# Patient Record
Sex: MALE | Race: WHITE | NOT HISPANIC OR LATINO | Employment: FULL TIME | URBAN - METROPOLITAN AREA
[De-identification: names, ages, dates, MRNs, and addresses within clinical notes are randomized per-mention and may not be internally consistent; named-entity substitution may affect disease eponyms.]

---

## 2023-08-12 ENCOUNTER — HOSPITAL ENCOUNTER (INPATIENT)
Facility: HOSPITAL | Age: 23
LOS: 4 days | Discharge: HOME HEALTH | DRG: 958 | End: 2023-08-16
Attending: EMERGENCY MEDICINE | Admitting: SURGERY
Payer: MEDICAID
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
## 2023-08-12 DIAGNOSIS — S52.302A CLOSED FRACTURE OF SHAFT OF LEFT RADIUS, UNSPECIFIED FRACTURE MORPHOLOGY, INITIAL ENCOUNTER: ICD-10-CM

## 2023-08-12 DIAGNOSIS — T14.90XA BLUNT TRAUMA: ICD-10-CM

## 2023-08-12 DIAGNOSIS — T07.XXXA ABRASION, MULTIPLE SITES: Primary | ICD-10-CM

## 2023-08-12 DIAGNOSIS — R07.9 CHEST PAIN: ICD-10-CM

## 2023-08-12 DIAGNOSIS — S90.811A ABRASION, RIGHT FOOT, INITIAL ENCOUNTER: ICD-10-CM

## 2023-08-12 DIAGNOSIS — S32.592A CLOSED FRACTURE OF LEFT INFERIOR PUBIC RAMUS, INITIAL ENCOUNTER: ICD-10-CM

## 2023-08-12 DIAGNOSIS — J93.9 PNEUMOTHORAX, UNSPECIFIED TYPE: ICD-10-CM

## 2023-08-12 DIAGNOSIS — S52.352A CLOSED DISPLACED COMMINUTED FRACTURE OF SHAFT OF LEFT RADIUS, INITIAL ENCOUNTER: ICD-10-CM

## 2023-08-12 LAB
ABORH RETYPE: NORMAL
ALBUMIN SERPL-MCNC: 4.4 G/DL (ref 3.5–5)
ALBUMIN/GLOB SERPL: 1.5 RATIO (ref 1.1–2)
ALP SERPL-CCNC: 87 UNIT/L (ref 40–150)
ALT SERPL-CCNC: 40 UNIT/L (ref 0–55)
APTT PPP: 24.5 SECONDS (ref 23.2–33.7)
AST SERPL-CCNC: 45 UNIT/L (ref 5–34)
BASOPHILS # BLD AUTO: 0.02 X10(3)/MCL
BASOPHILS NFR BLD AUTO: 0.2 %
BILIRUB SERPL-MCNC: 0.3 MG/DL
BUN SERPL-MCNC: 15.4 MG/DL (ref 8.9–20.6)
CALCIUM SERPL-MCNC: 9.5 MG/DL (ref 8.4–10.2)
CHLORIDE SERPL-SCNC: 106 MMOL/L (ref 98–107)
CO2 SERPL-SCNC: 22 MMOL/L (ref 22–29)
CREAT SERPL-MCNC: 1.06 MG/DL (ref 0.73–1.18)
EOSINOPHIL # BLD AUTO: 0.01 X10(3)/MCL (ref 0–0.9)
EOSINOPHIL NFR BLD AUTO: 0.1 %
ERYTHROCYTE [DISTWIDTH] IN BLOOD BY AUTOMATED COUNT: 14.4 % (ref 11.5–17)
ETHANOL SERPL-MCNC: <10 MG/DL
GFR SERPLBLD CREATININE-BSD FMLA CKD-EPI: >60 MLS/MIN/1.73/M2
GLOBULIN SER-MCNC: 3 GM/DL (ref 2.4–3.5)
GLUCOSE SERPL-MCNC: 103 MG/DL (ref 74–100)
HCT VFR BLD AUTO: 43.4 % (ref 42–52)
HGB BLD-MCNC: 14.7 G/DL (ref 14–18)
IMM GRANULOCYTES # BLD AUTO: 0.49 X10(3)/MCL (ref 0–0.04)
IMM GRANULOCYTES NFR BLD AUTO: 4.4 %
INR PPP: 1
LACTATE SERPL-SCNC: 2.9 MMOL/L (ref 0.5–2.2)
LYMPHOCYTES # BLD AUTO: 2.6 X10(3)/MCL (ref 0.6–4.6)
LYMPHOCYTES NFR BLD AUTO: 23.4 %
MCH RBC QN AUTO: 26.7 PG (ref 27–31)
MCHC RBC AUTO-ENTMCNC: 33.9 G/DL (ref 33–36)
MCV RBC AUTO: 78.9 FL (ref 80–94)
MONOCYTES # BLD AUTO: 0.69 X10(3)/MCL (ref 0.1–1.3)
MONOCYTES NFR BLD AUTO: 6.2 %
NEUTROPHILS # BLD AUTO: 7.31 X10(3)/MCL (ref 2.1–9.2)
NEUTROPHILS NFR BLD AUTO: 65.7 %
NRBC BLD AUTO-RTO: 0 %
PLATELET # BLD AUTO: 206 X10(3)/MCL (ref 130–400)
PMV BLD AUTO: 10.7 FL (ref 7.4–10.4)
POTASSIUM SERPL-SCNC: 3.6 MMOL/L (ref 3.5–5.1)
PROT SERPL-MCNC: 7.4 GM/DL (ref 6.4–8.3)
PROTHROMBIN TIME: 13.4 SECONDS (ref 12.5–14.5)
RBC # BLD AUTO: 5.5 X10(6)/MCL (ref 4.7–6.1)
SODIUM SERPL-SCNC: 141 MMOL/L (ref 136–145)
TROPONIN I SERPL-MCNC: 0.01 NG/ML (ref 0–0.04)
WBC # SPEC AUTO: 11.12 X10(3)/MCL (ref 4.5–11.5)

## 2023-08-12 PROCEDURE — 85610 PROTHROMBIN TIME: CPT | Performed by: EMERGENCY MEDICINE

## 2023-08-12 PROCEDURE — 85730 THROMBOPLASTIN TIME PARTIAL: CPT | Performed by: EMERGENCY MEDICINE

## 2023-08-12 PROCEDURE — G0390 TRAUMA RESPONS W/HOSP CRITI: HCPCS

## 2023-08-12 PROCEDURE — 63600175 PHARM REV CODE 636 W HCPCS: Performed by: EMERGENCY MEDICINE

## 2023-08-12 PROCEDURE — 99285 EMERGENCY DEPT VISIT HI MDM: CPT | Mod: 25

## 2023-08-12 PROCEDURE — 84484 ASSAY OF TROPONIN QUANT: CPT | Performed by: EMERGENCY MEDICINE

## 2023-08-12 PROCEDURE — 86900 BLOOD TYPING SEROLOGIC ABO: CPT | Performed by: EMERGENCY MEDICINE

## 2023-08-12 PROCEDURE — 96375 TX/PRO/DX INJ NEW DRUG ADDON: CPT

## 2023-08-12 PROCEDURE — 63600175 PHARM REV CODE 636 W HCPCS

## 2023-08-12 PROCEDURE — 80053 COMPREHEN METABOLIC PANEL: CPT | Performed by: EMERGENCY MEDICINE

## 2023-08-12 PROCEDURE — 85025 COMPLETE CBC W/AUTO DIFF WBC: CPT | Performed by: EMERGENCY MEDICINE

## 2023-08-12 PROCEDURE — 82077 ASSAY SPEC XCP UR&BREATH IA: CPT | Performed by: EMERGENCY MEDICINE

## 2023-08-12 PROCEDURE — 83605 ASSAY OF LACTIC ACID: CPT | Performed by: EMERGENCY MEDICINE

## 2023-08-12 PROCEDURE — 99291 CRITICAL CARE FIRST HOUR: CPT

## 2023-08-12 PROCEDURE — 20800000 HC ICU TRAUMA

## 2023-08-12 PROCEDURE — 96374 THER/PROPH/DIAG INJ IV PUSH: CPT

## 2023-08-12 PROCEDURE — 11000001 HC ACUTE MED/SURG PRIVATE ROOM

## 2023-08-12 RX ORDER — CEFAZOLIN SODIUM 1 G/3ML
INJECTION, POWDER, FOR SOLUTION INTRAMUSCULAR; INTRAVENOUS
Status: COMPLETED
Start: 2023-08-12 | End: 2023-08-12

## 2023-08-12 RX ORDER — FENTANYL CITRATE 50 UG/ML
INJECTION, SOLUTION INTRAMUSCULAR; INTRAVENOUS CODE/TRAUMA/SEDATION MEDICATION
Status: DISCONTINUED | OUTPATIENT
Start: 2023-08-12 | End: 2023-08-12

## 2023-08-12 RX ORDER — SODIUM CHLORIDE 9 MG/ML
INJECTION, SOLUTION INTRAVENOUS CONTINUOUS
Status: DISCONTINUED | OUTPATIENT
Start: 2023-08-13 | End: 2023-08-12

## 2023-08-12 RX ORDER — BISACODYL 10 MG
10 SUPPOSITORY, RECTAL RECTAL DAILY PRN
Status: DISCONTINUED | OUTPATIENT
Start: 2023-08-13 | End: 2023-08-12

## 2023-08-12 RX ORDER — FAMOTIDINE 20 MG/1
20 TABLET, FILM COATED ORAL 2 TIMES DAILY
Status: DISCONTINUED | OUTPATIENT
Start: 2023-08-12 | End: 2023-08-12

## 2023-08-12 RX ORDER — TALC
6 POWDER (GRAM) TOPICAL NIGHTLY PRN
Status: DISCONTINUED | OUTPATIENT
Start: 2023-08-13 | End: 2023-08-12

## 2023-08-12 RX ORDER — FENTANYL CITRATE 50 UG/ML
INJECTION, SOLUTION INTRAMUSCULAR; INTRAVENOUS
Status: DISPENSED
Start: 2023-08-12 | End: 2023-08-13

## 2023-08-12 RX ORDER — ONDANSETRON 2 MG/ML
INJECTION INTRAMUSCULAR; INTRAVENOUS CODE/TRAUMA/SEDATION MEDICATION
Status: DISCONTINUED | OUTPATIENT
Start: 2023-08-12 | End: 2023-08-12

## 2023-08-12 RX ORDER — ONDANSETRON 2 MG/ML
INJECTION INTRAMUSCULAR; INTRAVENOUS
Status: DISPENSED
Start: 2023-08-12 | End: 2023-08-13

## 2023-08-12 RX ORDER — POLYETHYLENE GLYCOL 3350 17 G/17G
17 POWDER, FOR SOLUTION ORAL 2 TIMES DAILY
Status: DISCONTINUED | OUTPATIENT
Start: 2023-08-12 | End: 2023-08-12

## 2023-08-12 RX ORDER — FENTANYL CITRATE-0.9 % NACL/PF 10 MCG/ML
0-250 PLASTIC BAG, INJECTION (ML) INTRAVENOUS CONTINUOUS
Status: DISCONTINUED | OUTPATIENT
Start: 2023-08-13 | End: 2023-08-12

## 2023-08-12 RX ORDER — PROPOFOL 10 MG/ML
0-50 INJECTION, EMULSION INTRAVENOUS CONTINUOUS
Status: DISCONTINUED | OUTPATIENT
Start: 2023-08-13 | End: 2023-08-12

## 2023-08-12 RX ADMIN — ONDANSETRON 4 MG: 2 INJECTION INTRAMUSCULAR; INTRAVENOUS at 11:08

## 2023-08-12 RX ADMIN — CEFAZOLIN 2000 MG: 330 INJECTION, POWDER, FOR SOLUTION INTRAMUSCULAR; INTRAVENOUS at 11:08

## 2023-08-12 RX ADMIN — FENTANYL CITRATE 100 MCG: 50 INJECTION, SOLUTION INTRAMUSCULAR; INTRAVENOUS at 11:08
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
## 2023-08-13 ENCOUNTER — ANESTHESIA EVENT (OUTPATIENT)
Dept: SURGERY | Facility: HOSPITAL | Age: 23
DRG: 958 | End: 2023-08-13
Payer: MEDICAID

## 2023-08-13 ENCOUNTER — ANESTHESIA (OUTPATIENT)
Dept: SURGERY | Facility: HOSPITAL | Age: 23
DRG: 958 | End: 2023-08-13
Payer: MEDICAID

## 2023-08-13 PROBLEM — S52.302A FRACTURE OF RADIAL SHAFT, LEFT, CLOSED: Status: ACTIVE | Noted: 2023-08-13

## 2023-08-13 PROBLEM — S32.810A: Status: ACTIVE | Noted: 2023-08-13

## 2023-08-13 LAB
ALBUMIN SERPL-MCNC: 4 G/DL (ref 3.5–5)
ALBUMIN/GLOB SERPL: 1.9 RATIO (ref 1.1–2)
ALP SERPL-CCNC: 74 UNIT/L (ref 40–150)
ALT SERPL-CCNC: 37 UNIT/L (ref 0–55)
AST SERPL-CCNC: 54 UNIT/L (ref 5–34)
BILIRUB SERPL-MCNC: 0.4 MG/DL
BUN SERPL-MCNC: 15.2 MG/DL (ref 8.9–20.6)
CALCIUM SERPL-MCNC: 8.8 MG/DL (ref 8.4–10.2)
CHLORIDE SERPL-SCNC: 108 MMOL/L (ref 98–107)
CO2 SERPL-SCNC: 21 MMOL/L (ref 22–29)
CREAT SERPL-MCNC: 0.94 MG/DL (ref 0.73–1.18)
ERYTHROCYTE [DISTWIDTH] IN BLOOD BY AUTOMATED COUNT: 14.4 % (ref 11.5–17)
GFR SERPLBLD CREATININE-BSD FMLA CKD-EPI: >60 MLS/MIN/1.73/M2
GLOBULIN SER-MCNC: 2.1 GM/DL (ref 2.4–3.5)
GLUCOSE SERPL-MCNC: 121 MG/DL (ref 70–110)
GLUCOSE SERPL-MCNC: 99 MG/DL (ref 74–100)
GROUP & RH: NORMAL
HCT VFR BLD AUTO: 38.4 % (ref 42–52)
HGB BLD-MCNC: 13.2 G/DL (ref 14–18)
INDIRECT COOMBS GEL: NORMAL
LACTATE SERPL-SCNC: 0.9 MMOL/L (ref 0.5–2.2)
LACTATE SERPL-SCNC: 1.6 MMOL/L (ref 0.5–2.2)
MAGNESIUM SERPL-MCNC: 1.7 MG/DL (ref 1.6–2.6)
MCH RBC QN AUTO: 26.9 PG (ref 27–31)
MCHC RBC AUTO-ENTMCNC: 34.4 G/DL (ref 33–36)
MCV RBC AUTO: 78.2 FL (ref 80–94)
NRBC BLD AUTO-RTO: 0 %
PHOSPHATE SERPL-MCNC: 3 MG/DL (ref 2.3–4.7)
PLATELET # BLD AUTO: 158 X10(3)/MCL (ref 130–400)
PMV BLD AUTO: 10.7 FL (ref 7.4–10.4)
POTASSIUM SERPL-SCNC: 3.8 MMOL/L (ref 3.5–5.1)
PROT SERPL-MCNC: 6.1 GM/DL (ref 6.4–8.3)
RBC # BLD AUTO: 4.91 X10(6)/MCL (ref 4.7–6.1)
SODIUM SERPL-SCNC: 139 MMOL/L (ref 136–145)
SPECIMEN OUTDATE: NORMAL
WBC # SPEC AUTO: 15.89 X10(3)/MCL (ref 4.5–11.5)

## 2023-08-13 PROCEDURE — 25515 OPTX RADIAL SHAFT FRACTURE: CPT | Mod: AS,LT,, | Performed by: PHYSICIAN ASSISTANT

## 2023-08-13 PROCEDURE — 85027 COMPLETE CBC AUTOMATED: CPT

## 2023-08-13 PROCEDURE — 25000003 PHARM REV CODE 250: Performed by: NURSE ANESTHETIST, CERTIFIED REGISTERED

## 2023-08-13 PROCEDURE — 63600175 PHARM REV CODE 636 W HCPCS: Performed by: EMERGENCY MEDICINE

## 2023-08-13 PROCEDURE — 80053 COMPREHEN METABOLIC PANEL: CPT

## 2023-08-13 PROCEDURE — 24665 PR OPEN TX RADIAL HEAD/NECK FRACTURE: ICD-10-PCS | Mod: 51,LT,, | Performed by: ORTHOPAEDIC SURGERY

## 2023-08-13 PROCEDURE — 84100 ASSAY OF PHOSPHORUS: CPT

## 2023-08-13 PROCEDURE — 24665 OPTX RADIAL HEAD/NECK FX: CPT | Mod: AS,51,LT, | Performed by: PHYSICIAN ASSISTANT

## 2023-08-13 PROCEDURE — C1713 ANCHOR/SCREW BN/BN,TIS/BN: HCPCS | Performed by: ORTHOPAEDIC SURGERY

## 2023-08-13 PROCEDURE — 36000711: Performed by: ORTHOPAEDIC SURGERY

## 2023-08-13 PROCEDURE — 99223 1ST HOSP IP/OBS HIGH 75: CPT | Mod: 57,,, | Performed by: ORTHOPAEDIC SURGERY

## 2023-08-13 PROCEDURE — 71000015 HC POSTOP RECOV 1ST HR: Performed by: ORTHOPAEDIC SURGERY

## 2023-08-13 PROCEDURE — 24685 PR OPEN TX ULNAR FRACTURE PROX END: ICD-10-PCS | Mod: AS,51,LT, | Performed by: PHYSICIAN ASSISTANT

## 2023-08-13 PROCEDURE — 36000710: Performed by: ORTHOPAEDIC SURGERY

## 2023-08-13 PROCEDURE — 27201423 OPTIME MED/SURG SUP & DEVICES STERILE SUPPLY: Performed by: ORTHOPAEDIC SURGERY

## 2023-08-13 PROCEDURE — 83605 ASSAY OF LACTIC ACID: CPT

## 2023-08-13 PROCEDURE — 63600175 PHARM REV CODE 636 W HCPCS: Performed by: ANESTHESIOLOGY

## 2023-08-13 PROCEDURE — 37000008 HC ANESTHESIA 1ST 15 MINUTES: Performed by: ORTHOPAEDIC SURGERY

## 2023-08-13 PROCEDURE — 25000003 PHARM REV CODE 250

## 2023-08-13 PROCEDURE — 83735 ASSAY OF MAGNESIUM: CPT

## 2023-08-13 PROCEDURE — 11000001 HC ACUTE MED/SURG PRIVATE ROOM

## 2023-08-13 PROCEDURE — 71000039 HC RECOVERY, EACH ADD'L HOUR: Performed by: ORTHOPAEDIC SURGERY

## 2023-08-13 PROCEDURE — 71000033 HC RECOVERY, INTIAL HOUR: Performed by: ORTHOPAEDIC SURGERY

## 2023-08-13 PROCEDURE — 63600175 PHARM REV CODE 636 W HCPCS

## 2023-08-13 PROCEDURE — 37000009 HC ANESTHESIA EA ADD 15 MINS: Performed by: ORTHOPAEDIC SURGERY

## 2023-08-13 PROCEDURE — 63600175 PHARM REV CODE 636 W HCPCS: Performed by: ORTHOPAEDIC SURGERY

## 2023-08-13 PROCEDURE — 24685 PR OPEN TX ULNAR FRACTURE PROX END: ICD-10-PCS | Mod: 51,LT,, | Performed by: ORTHOPAEDIC SURGERY

## 2023-08-13 PROCEDURE — D9220A PRA ANESTHESIA: ICD-10-PCS | Mod: CRNA,,, | Performed by: NURSE ANESTHETIST, CERTIFIED REGISTERED

## 2023-08-13 PROCEDURE — 24685 OPTX ULNAR FX PROX END W/FIX: CPT | Mod: 51,LT,, | Performed by: ORTHOPAEDIC SURGERY

## 2023-08-13 PROCEDURE — D9220A PRA ANESTHESIA: ICD-10-PCS | Mod: ANES,,, | Performed by: ANESTHESIOLOGY

## 2023-08-13 PROCEDURE — 24665 PR OPEN TX RADIAL HEAD/NECK FRACTURE: ICD-10-PCS | Mod: AS,51,LT, | Performed by: PHYSICIAN ASSISTANT

## 2023-08-13 PROCEDURE — D9220A PRA ANESTHESIA: Mod: CRNA,,, | Performed by: NURSE ANESTHETIST, CERTIFIED REGISTERED

## 2023-08-13 PROCEDURE — 93005 ELECTROCARDIOGRAM TRACING: CPT

## 2023-08-13 PROCEDURE — 25515 PR OPEN TREATMENT RADIAL SHAFT FRACTURE: ICD-10-PCS | Mod: LT,,, | Performed by: ORTHOPAEDIC SURGERY

## 2023-08-13 PROCEDURE — 63600175 PHARM REV CODE 636 W HCPCS: Performed by: NURSE ANESTHETIST, CERTIFIED REGISTERED

## 2023-08-13 PROCEDURE — 24685 OPTX ULNAR FX PROX END W/FIX: CPT | Mod: AS,51,LT, | Performed by: PHYSICIAN ASSISTANT

## 2023-08-13 PROCEDURE — 99223 PR INITIAL HOSPITAL CARE,LEVL III: ICD-10-PCS | Mod: 57,,, | Performed by: ORTHOPAEDIC SURGERY

## 2023-08-13 PROCEDURE — D9220A PRA ANESTHESIA: Mod: ANES,,, | Performed by: ANESTHESIOLOGY

## 2023-08-13 PROCEDURE — 25515 OPTX RADIAL SHAFT FRACTURE: CPT | Mod: LT,,, | Performed by: ORTHOPAEDIC SURGERY

## 2023-08-13 PROCEDURE — 25515 PR OPEN TREATMENT RADIAL SHAFT FRACTURE: ICD-10-PCS | Mod: AS,LT,, | Performed by: PHYSICIAN ASSISTANT

## 2023-08-13 PROCEDURE — 24665 OPTX RADIAL HEAD/NECK FX: CPT | Mod: 51,LT,, | Performed by: ORTHOPAEDIC SURGERY

## 2023-08-13 PROCEDURE — 20800000 HC ICU TRAUMA

## 2023-08-13 DEVICE — IMPLANTABLE DEVICE: Type: IMPLANTABLE DEVICE | Site: ARM | Status: FUNCTIONAL

## 2023-08-13 DEVICE — SCREW MULTITHREAD LOCKING: Type: IMPLANTABLE DEVICE | Site: ARM | Status: FUNCTIONAL

## 2023-08-13 RX ORDER — METHOCARBAMOL 500 MG/1
500 TABLET, FILM COATED ORAL EVERY 8 HOURS
Status: DISCONTINUED | OUTPATIENT
Start: 2023-08-13 | End: 2023-08-16 | Stop reason: HOSPADM

## 2023-08-13 RX ORDER — DEXAMETHASONE SODIUM PHOSPHATE 4 MG/ML
INJECTION, SOLUTION INTRA-ARTICULAR; INTRALESIONAL; INTRAMUSCULAR; INTRAVENOUS; SOFT TISSUE
Status: DISCONTINUED | OUTPATIENT
Start: 2023-08-13 | End: 2023-08-13

## 2023-08-13 RX ORDER — HYDROMORPHONE HYDROCHLORIDE 2 MG/ML
1 INJECTION, SOLUTION INTRAMUSCULAR; INTRAVENOUS; SUBCUTANEOUS
Status: COMPLETED | OUTPATIENT
Start: 2023-08-13 | End: 2023-08-13

## 2023-08-13 RX ORDER — HYDROMORPHONE HYDROCHLORIDE 2 MG/ML
0.2 INJECTION, SOLUTION INTRAMUSCULAR; INTRAVENOUS; SUBCUTANEOUS EVERY 5 MIN PRN
Status: DISCONTINUED | OUTPATIENT
Start: 2023-08-13 | End: 2023-08-14

## 2023-08-13 RX ORDER — CEFAZOLIN SODIUM 1 G/3ML
INJECTION, POWDER, FOR SOLUTION INTRAMUSCULAR; INTRAVENOUS
Status: DISCONTINUED | OUTPATIENT
Start: 2023-08-13 | End: 2023-08-13

## 2023-08-13 RX ORDER — FENTANYL CITRATE 50 UG/ML
INJECTION, SOLUTION INTRAMUSCULAR; INTRAVENOUS
Status: DISPENSED
Start: 2023-08-13 | End: 2023-08-14

## 2023-08-13 RX ORDER — PROCHLORPERAZINE EDISYLATE 5 MG/ML
5 INJECTION INTRAMUSCULAR; INTRAVENOUS EVERY 30 MIN PRN
Status: DISCONTINUED | OUTPATIENT
Start: 2023-08-13 | End: 2023-08-14 | Stop reason: HOSPADM

## 2023-08-13 RX ORDER — HYDROMORPHONE HYDROCHLORIDE 2 MG/ML
INJECTION, SOLUTION INTRAMUSCULAR; INTRAVENOUS; SUBCUTANEOUS
Status: COMPLETED
Start: 2023-08-13 | End: 2023-08-13

## 2023-08-13 RX ORDER — LIDOCAINE HYDROCHLORIDE 10 MG/ML
1 INJECTION, SOLUTION EPIDURAL; INFILTRATION; INTRACAUDAL; PERINEURAL ONCE
Status: DISCONTINUED | OUTPATIENT
Start: 2023-08-13 | End: 2023-08-14 | Stop reason: HOSPADM

## 2023-08-13 RX ORDER — ACETAMINOPHEN 325 MG/1
650 TABLET ORAL EVERY 4 HOURS
Status: DISCONTINUED | OUTPATIENT
Start: 2023-08-13 | End: 2023-08-15

## 2023-08-13 RX ORDER — ONDANSETRON 2 MG/ML
INJECTION INTRAMUSCULAR; INTRAVENOUS
Status: DISCONTINUED | OUTPATIENT
Start: 2023-08-13 | End: 2023-08-13

## 2023-08-13 RX ORDER — DOCUSATE SODIUM 100 MG/1
100 CAPSULE, LIQUID FILLED ORAL 2 TIMES DAILY
Status: DISCONTINUED | OUTPATIENT
Start: 2023-08-13 | End: 2023-08-16 | Stop reason: HOSPADM

## 2023-08-13 RX ORDER — SODIUM CHLORIDE, SODIUM GLUCONATE, SODIUM ACETATE, POTASSIUM CHLORIDE AND MAGNESIUM CHLORIDE 30; 37; 368; 526; 502 MG/100ML; MG/100ML; MG/100ML; MG/100ML; MG/100ML
INJECTION, SOLUTION INTRAVENOUS CONTINUOUS
Status: DISCONTINUED | OUTPATIENT
Start: 2023-08-13 | End: 2023-08-14

## 2023-08-13 RX ORDER — OXYCODONE HYDROCHLORIDE 5 MG/1
5 TABLET ORAL EVERY 4 HOURS PRN
Status: DISCONTINUED | OUTPATIENT
Start: 2023-08-13 | End: 2023-08-15

## 2023-08-13 RX ORDER — HYDROMORPHONE HYDROCHLORIDE 2 MG/ML
0.4 INJECTION, SOLUTION INTRAMUSCULAR; INTRAVENOUS; SUBCUTANEOUS EVERY 5 MIN PRN
Status: DISCONTINUED | OUTPATIENT
Start: 2023-08-13 | End: 2023-08-14

## 2023-08-13 RX ORDER — PROPOFOL 10 MG/ML
VIAL (ML) INTRAVENOUS
Status: DISCONTINUED | OUTPATIENT
Start: 2023-08-13 | End: 2023-08-13

## 2023-08-13 RX ORDER — CEFAZOLIN SODIUM 2 G/50ML
2 SOLUTION INTRAVENOUS
Status: COMPLETED | OUTPATIENT
Start: 2023-08-14 | End: 2023-08-14

## 2023-08-13 RX ORDER — POLYETHYLENE GLYCOL 3350 17 G/17G
17 POWDER, FOR SOLUTION ORAL 2 TIMES DAILY
Status: DISCONTINUED | OUTPATIENT
Start: 2023-08-13 | End: 2023-08-16 | Stop reason: HOSPADM

## 2023-08-13 RX ORDER — MEPERIDINE HYDROCHLORIDE 25 MG/ML
INJECTION INTRAMUSCULAR; INTRAVENOUS; SUBCUTANEOUS
Status: COMPLETED
Start: 2023-08-13 | End: 2023-08-13

## 2023-08-13 RX ORDER — FENTANYL CITRATE 50 UG/ML
INJECTION, SOLUTION INTRAMUSCULAR; INTRAVENOUS
Status: DISCONTINUED | OUTPATIENT
Start: 2023-08-13 | End: 2023-08-13

## 2023-08-13 RX ORDER — MIDAZOLAM HYDROCHLORIDE 1 MG/ML
INJECTION INTRAMUSCULAR; INTRAVENOUS
Status: DISCONTINUED | OUTPATIENT
Start: 2023-08-13 | End: 2023-08-13

## 2023-08-13 RX ORDER — ONDANSETRON 4 MG/1
8 TABLET, ORALLY DISINTEGRATING ORAL EVERY 6 HOURS PRN
Status: DISCONTINUED | OUTPATIENT
Start: 2023-08-13 | End: 2023-08-14 | Stop reason: HOSPADM

## 2023-08-13 RX ORDER — ACETAMINOPHEN 10 MG/ML
INJECTION, SOLUTION INTRAVENOUS
Status: COMPLETED
Start: 2023-08-13 | End: 2023-08-13

## 2023-08-13 RX ORDER — FENTANYL CITRATE 50 UG/ML
50 INJECTION, SOLUTION INTRAMUSCULAR; INTRAVENOUS EVERY 30 MIN PRN
Status: DISCONTINUED | OUTPATIENT
Start: 2023-08-13 | End: 2023-08-14

## 2023-08-13 RX ORDER — ONDANSETRON 2 MG/ML
4 INJECTION INTRAMUSCULAR; INTRAVENOUS DAILY PRN
Status: DISCONTINUED | OUTPATIENT
Start: 2023-08-13 | End: 2023-08-14 | Stop reason: HOSPADM

## 2023-08-13 RX ORDER — ENOXAPARIN SODIUM 100 MG/ML
40 INJECTION SUBCUTANEOUS EVERY 12 HOURS
Status: DISCONTINUED | OUTPATIENT
Start: 2023-08-13 | End: 2023-08-16 | Stop reason: HOSPADM

## 2023-08-13 RX ORDER — ADHESIVE BANDAGE
30 BANDAGE TOPICAL DAILY PRN
Status: DISCONTINUED | OUTPATIENT
Start: 2023-08-13 | End: 2023-08-16 | Stop reason: HOSPADM

## 2023-08-13 RX ORDER — MEPERIDINE HYDROCHLORIDE 25 MG/ML
12.5 INJECTION INTRAMUSCULAR; INTRAVENOUS; SUBCUTANEOUS EVERY 10 MIN PRN
Status: DISCONTINUED | OUTPATIENT
Start: 2023-08-13 | End: 2023-08-14 | Stop reason: HOSPADM

## 2023-08-13 RX ORDER — GABAPENTIN 300 MG/1
300 CAPSULE ORAL 3 TIMES DAILY
Status: DISCONTINUED | OUTPATIENT
Start: 2023-08-13 | End: 2023-08-16 | Stop reason: HOSPADM

## 2023-08-13 RX ORDER — CEFAZOLIN SODIUM 2 G/50ML
SOLUTION INTRAVENOUS
Status: DISPENSED
Start: 2023-08-13 | End: 2023-08-13

## 2023-08-13 RX ORDER — ACETAMINOPHEN 10 MG/ML
1000 INJECTION, SOLUTION INTRAVENOUS ONCE
Status: COMPLETED | OUTPATIENT
Start: 2023-08-13 | End: 2023-08-13

## 2023-08-13 RX ORDER — LORAZEPAM 2 MG/ML
0.25 INJECTION INTRAMUSCULAR ONCE AS NEEDED
Status: COMPLETED | OUTPATIENT
Start: 2023-08-13 | End: 2023-08-14

## 2023-08-13 RX ORDER — IPRATROPIUM BROMIDE AND ALBUTEROL SULFATE 2.5; .5 MG/3ML; MG/3ML
3 SOLUTION RESPIRATORY (INHALATION)
Status: DISCONTINUED | OUTPATIENT
Start: 2023-08-13 | End: 2023-08-14 | Stop reason: HOSPADM

## 2023-08-13 RX ORDER — TALC
6 POWDER (GRAM) TOPICAL NIGHTLY PRN
Status: DISCONTINUED | OUTPATIENT
Start: 2023-08-13 | End: 2023-08-16 | Stop reason: HOSPADM

## 2023-08-13 RX ORDER — SODIUM CHLORIDE, SODIUM LACTATE, POTASSIUM CHLORIDE, CALCIUM CHLORIDE 600; 310; 30; 20 MG/100ML; MG/100ML; MG/100ML; MG/100ML
INJECTION, SOLUTION INTRAVENOUS CONTINUOUS
Status: DISCONTINUED | OUTPATIENT
Start: 2023-08-13 | End: 2023-08-14

## 2023-08-13 RX ORDER — MIDAZOLAM HYDROCHLORIDE 1 MG/ML
2 INJECTION INTRAMUSCULAR; INTRAVENOUS ONCE AS NEEDED
Status: COMPLETED | OUTPATIENT
Start: 2023-08-13 | End: 2023-08-13

## 2023-08-13 RX ORDER — VANCOMYCIN HYDROCHLORIDE 1 G/20ML
INJECTION, POWDER, LYOPHILIZED, FOR SOLUTION INTRAVENOUS
Status: DISCONTINUED | OUTPATIENT
Start: 2023-08-13 | End: 2023-08-13 | Stop reason: HOSPADM

## 2023-08-13 RX ORDER — OXYCODONE HYDROCHLORIDE 10 MG/1
10 TABLET ORAL EVERY 4 HOURS PRN
Status: DISCONTINUED | OUTPATIENT
Start: 2023-08-13 | End: 2023-08-15

## 2023-08-13 RX ORDER — ROCURONIUM BROMIDE 10 MG/ML
INJECTION, SOLUTION INTRAVENOUS
Status: DISCONTINUED | OUTPATIENT
Start: 2023-08-13 | End: 2023-08-13

## 2023-08-13 RX ADMIN — SODIUM CHLORIDE, POTASSIUM CHLORIDE, SODIUM LACTATE AND CALCIUM CHLORIDE: 600; 310; 30; 20 INJECTION, SOLUTION INTRAVENOUS at 05:08

## 2023-08-13 RX ADMIN — SODIUM CHLORIDE, SODIUM GLUCONATE, SODIUM ACETATE, POTASSIUM CHLORIDE AND MAGNESIUM CHLORIDE: 526; 502; 368; 37; 30 INJECTION, SOLUTION INTRAVENOUS at 04:08

## 2023-08-13 RX ADMIN — MIDAZOLAM HYDROCHLORIDE 2 MG: 1 INJECTION, SOLUTION INTRAMUSCULAR; INTRAVENOUS at 12:08

## 2023-08-13 RX ADMIN — MEPERIDINE HYDROCHLORIDE 12.5 MG: 25 INJECTION INTRAMUSCULAR; INTRAVENOUS; SUBCUTANEOUS at 07:08

## 2023-08-13 RX ADMIN — ACETAMINOPHEN 650 MG: 325 TABLET, FILM COATED ORAL at 05:08

## 2023-08-13 RX ADMIN — ENOXAPARIN SODIUM 40 MG: 40 INJECTION SUBCUTANEOUS at 09:08

## 2023-08-13 RX ADMIN — SUGAMMADEX 200 MG: 100 INJECTION, SOLUTION INTRAVENOUS at 06:08

## 2023-08-13 RX ADMIN — HYDROMORPHONE HYDROCHLORIDE 0.4 MG: 2 INJECTION INTRAMUSCULAR; INTRAVENOUS; SUBCUTANEOUS at 08:08

## 2023-08-13 RX ADMIN — FENTANYL CITRATE 50 MCG: 50 INJECTION, SOLUTION INTRAMUSCULAR; INTRAVENOUS at 06:08

## 2023-08-13 RX ADMIN — SODIUM CHLORIDE, POTASSIUM CHLORIDE, SODIUM LACTATE AND CALCIUM CHLORIDE: 600; 310; 30; 20 INJECTION, SOLUTION INTRAVENOUS at 08:08

## 2023-08-13 RX ADMIN — DEXAMETHASONE SODIUM PHOSPHATE 8 MG: 4 INJECTION, SOLUTION INTRA-ARTICULAR; INTRALESIONAL; INTRAMUSCULAR; INTRAVENOUS; SOFT TISSUE at 04:08

## 2023-08-13 RX ADMIN — MIDAZOLAM HYDROCHLORIDE 2 MG: 1 INJECTION, SOLUTION INTRAMUSCULAR; INTRAVENOUS at 04:08

## 2023-08-13 RX ADMIN — HYDROMORPHONE HYDROCHLORIDE 0.4 MG: 2 INJECTION INTRAMUSCULAR; INTRAVENOUS; SUBCUTANEOUS at 07:08

## 2023-08-13 RX ADMIN — ROCURONIUM BROMIDE 50 MG: 10 SOLUTION INTRAVENOUS at 04:08

## 2023-08-13 RX ADMIN — SODIUM CHLORIDE, SODIUM GLUCONATE, SODIUM ACETATE, POTASSIUM CHLORIDE AND MAGNESIUM CHLORIDE: 526; 502; 368; 37; 30 INJECTION, SOLUTION INTRAVENOUS at 06:08

## 2023-08-13 RX ADMIN — ACETAMINOPHEN 1000 MG: 10 INJECTION, SOLUTION INTRAVENOUS at 12:08

## 2023-08-13 RX ADMIN — ENOXAPARIN SODIUM 40 MG: 40 INJECTION SUBCUTANEOUS at 10:08

## 2023-08-13 RX ADMIN — HYDROMORPHONE HYDROCHLORIDE 0.4 MG: 2 INJECTION INTRAMUSCULAR; INTRAVENOUS; SUBCUTANEOUS at 09:08

## 2023-08-13 RX ADMIN — CEFAZOLIN 2 G: 330 INJECTION, POWDER, FOR SOLUTION INTRAMUSCULAR; INTRAVENOUS at 04:08

## 2023-08-13 RX ADMIN — FENTANYL CITRATE 100 MCG: 50 INJECTION, SOLUTION INTRAMUSCULAR; INTRAVENOUS at 04:08

## 2023-08-13 RX ADMIN — METHOCARBAMOL 500 MG: 500 TABLET ORAL at 09:08

## 2023-08-13 RX ADMIN — ONDANSETRON 4 MG: 2 INJECTION INTRAMUSCULAR; INTRAVENOUS at 04:08

## 2023-08-13 RX ADMIN — HYDROMORPHONE HYDROCHLORIDE 1 MG: 2 INJECTION INTRAMUSCULAR; INTRAVENOUS; SUBCUTANEOUS at 12:08

## 2023-08-13 RX ADMIN — PROPOFOL 150 MG: 10 INJECTION, EMULSION INTRAVENOUS at 04:08

## 2023-08-13 RX ADMIN — GABAPENTIN 300 MG: 300 CAPSULE ORAL at 08:08

## 2023-08-13 RX ADMIN — METHOCARBAMOL 500 MG: 500 TABLET ORAL at 05:08
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
## 2023-08-13 NOTE — ANESTHESIA PROCEDURE NOTES
Intubation    Date/Time: 8/13/2023 4:38 PM    Performed by: Boni Hernandez CRNA  Authorized by: Sudheer Muñoz Jr., MD    Intubation:     Induction:  Intravenous    Intubated:  Postinduction    Mask Ventilation:  Easy mask    Attempts:  1    Attempted By:  CRNA    Method of Intubation:  Direct    Blade:  De Souza 2    Laryngeal View Grade: Grade I - full view of cords      Difficult Airway Encountered?: No      Complications:  None    Airway Device:  Oral endotracheal tube    Airway Device Size:  7.5    Style/Cuff Inflation:  Cuffed    Tube secured:  22    Secured at:  The lips    Placement Verified By:  Capnometry and Revisualization with laryngoscopy    Complicating Factors:  None    Findings Post-Intubation:  BS equal bilateral and atraumatic/condition of teeth unchanged

## 2023-08-13 NOTE — ED NOTES
Pt. transferred from CT table to ED stretcher w/o incident. No neuro changes, PMS remains intact. Pt. C/O pain to L arm.
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
## 2023-08-13 NOTE — PROGRESS NOTES
"Acute Care Surgery  Daily Progress Note  08/13/2023  7:09 AM    Subjective:  NAEON  AF HDS    Objective:  VITAL SIGNS: 24 HR MIN & MAX LAST   Temp  Min: 98.1 °F (36.7 °C)  Max: 98.3 °F (36.8 °C)  98.2 °F (36.8 °C)   BP  Min: 106/67  Max: 139/82  117/75    Pulse  Min: 73  Max: 114  73    Resp  Min: 15  Max: 34  19    SpO2  Min: 92 %  Max: 99 %  96 %      HT: 5' 2" (157.5 cm)  WT: 52.2 kg (115 lb)  BMI: 21     Physical examination:  Gen: NAD, AAOx3, answering questions appropriately  HEENT: NCAT  CV: RR  Resp: NWOB  Abd: S/NT/ND  Ext: moving all extremities spontaneously and purposefully  Neuro: CN II-XII in tact    Labs:  Recent Labs     08/12/23 2321 08/13/23  0343   WBC 11.12 15.89*   RBC 5.50 4.91   HGB 14.7 13.2*   HCT 43.4 38.4*    158   MCV 78.9* 78.2*   MCH 26.7* 26.9*   MCHC 33.9 34.4     Recent Labs     08/12/23 2321 08/13/23  0343   CALCIUM 9.5 8.8   ALBUMIN 4.4 4.0    139   K 3.6 3.8   CO2 22 21*   BUN 15.4 15.2   CREATININE 1.06 0.94   ALKPHOS 87 74   ALT 40 37   AST 45* 54*   BILITOT 0.3 0.4       Assessment and Plan:  NATALIO pedersen small L pneumothorax    - Follow up ortho   - Repeat CXR this am   - Domingo Welch MD  [employer] General Surgery, PGY4  [facility]    "
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
## 2023-08-13 NOTE — TRANSFER OF CARE
"Anesthesia Transfer of Care Note    Patient: Esdras Souza    Procedure(s) Performed: Procedure(s) (LRB):  ORIF, FOREARM (Left)    Patient location: PACU    Anesthesia Type: general    Transport from OR: Transported from OR on room air with adequate spontaneous ventilation    Post pain: adequate analgesia    Post assessment: no apparent anesthetic complications    Post vital signs: stable    Level of consciousness: sedated and responds to stimulation    Nausea/Vomiting: no nausea/vomiting    Complications: none    Transfer of care protocol was followed      Last vitals:   Visit Vitals  /67   Pulse 68   Temp 36.8 °C (98.2 °F) (Oral)   Resp 15   Ht 5' 2" (1.575 m)   Wt 52.2 kg (115 lb)   SpO2 98%   BMI 21.03 kg/m²     "

## 2023-08-14 LAB
ALBUMIN SERPL-MCNC: 3.3 G/DL (ref 3.5–5)
ALBUMIN/GLOB SERPL: 1.4 RATIO (ref 1.1–2)
ALP SERPL-CCNC: 61 UNIT/L (ref 40–150)
ALT SERPL-CCNC: 26 UNIT/L (ref 0–55)
AST SERPL-CCNC: 56 UNIT/L (ref 5–34)
BASOPHILS # BLD AUTO: 0.03 X10(3)/MCL
BASOPHILS NFR BLD AUTO: 0.2 %
BILIRUB SERPL-MCNC: 0.5 MG/DL
BUN SERPL-MCNC: 10 MG/DL (ref 8.9–20.6)
CALCIUM SERPL-MCNC: 8.3 MG/DL (ref 8.4–10.2)
CHLORIDE SERPL-SCNC: 100 MMOL/L (ref 98–107)
CO2 SERPL-SCNC: 25 MMOL/L (ref 22–29)
CREAT SERPL-MCNC: 0.77 MG/DL (ref 0.73–1.18)
CRP SERPL-MCNC: 156 MG/L
EOSINOPHIL # BLD AUTO: 0 X10(3)/MCL (ref 0–0.9)
EOSINOPHIL NFR BLD AUTO: 0 %
ERYTHROCYTE [DISTWIDTH] IN BLOOD BY AUTOMATED COUNT: 14.6 % (ref 11.5–17)
GFR SERPLBLD CREATININE-BSD FMLA CKD-EPI: >60 MLS/MIN/1.73/M2
GLOBULIN SER-MCNC: 2.4 GM/DL (ref 2.4–3.5)
GLUCOSE SERPL-MCNC: 80 MG/DL (ref 74–100)
HCT VFR BLD AUTO: 33.7 % (ref 42–52)
HGB BLD-MCNC: 11.2 G/DL (ref 14–18)
IMM GRANULOCYTES # BLD AUTO: 0.08 X10(3)/MCL (ref 0–0.04)
IMM GRANULOCYTES NFR BLD AUTO: 0.6 %
LACTATE SERPL-SCNC: 0.9 MMOL/L (ref 0.5–2.2)
LYMPHOCYTES # BLD AUTO: 0.78 X10(3)/MCL (ref 0.6–4.6)
LYMPHOCYTES NFR BLD AUTO: 6 %
MAGNESIUM SERPL-MCNC: 1.8 MG/DL (ref 1.6–2.6)
MCH RBC QN AUTO: 27.3 PG (ref 27–31)
MCHC RBC AUTO-ENTMCNC: 33.2 G/DL (ref 33–36)
MCV RBC AUTO: 82 FL (ref 80–94)
MONOCYTES # BLD AUTO: 0.85 X10(3)/MCL (ref 0.1–1.3)
MONOCYTES NFR BLD AUTO: 6.5 %
NEUTROPHILS # BLD AUTO: 11.3 X10(3)/MCL (ref 2.1–9.2)
NEUTROPHILS NFR BLD AUTO: 86.7 %
NRBC BLD AUTO-RTO: 0 %
PHOSPHATE SERPL-MCNC: 2.7 MG/DL (ref 2.3–4.7)
PLATELET # BLD AUTO: 134 X10(3)/MCL (ref 130–400)
PMV BLD AUTO: 10.8 FL (ref 7.4–10.4)
POTASSIUM SERPL-SCNC: 4.1 MMOL/L (ref 3.5–5.1)
PREALB SERPL-MCNC: 16.8 MG/DL (ref 18–45)
PROT SERPL-MCNC: 5.7 GM/DL (ref 6.4–8.3)
RBC # BLD AUTO: 4.11 X10(6)/MCL (ref 4.7–6.1)
SODIUM SERPL-SCNC: 133 MMOL/L (ref 136–145)
WBC # SPEC AUTO: 13.04 X10(3)/MCL (ref 4.5–11.5)

## 2023-08-14 PROCEDURE — 84134 ASSAY OF PREALBUMIN: CPT

## 2023-08-14 PROCEDURE — 85025 COMPLETE CBC W/AUTO DIFF WBC: CPT

## 2023-08-14 PROCEDURE — 25000003 PHARM REV CODE 250

## 2023-08-14 PROCEDURE — 80053 COMPREHEN METABOLIC PANEL: CPT

## 2023-08-14 PROCEDURE — 83605 ASSAY OF LACTIC ACID: CPT

## 2023-08-14 PROCEDURE — 63600175 PHARM REV CODE 636 W HCPCS: Performed by: ANESTHESIOLOGY

## 2023-08-14 PROCEDURE — 83735 ASSAY OF MAGNESIUM: CPT

## 2023-08-14 PROCEDURE — 84100 ASSAY OF PHOSPHORUS: CPT

## 2023-08-14 PROCEDURE — 63600175 PHARM REV CODE 636 W HCPCS

## 2023-08-14 PROCEDURE — 63600175 PHARM REV CODE 636 W HCPCS: Mod: JZ

## 2023-08-14 PROCEDURE — 63600175 PHARM REV CODE 636 W HCPCS: Performed by: PHYSICIAN ASSISTANT

## 2023-08-14 PROCEDURE — 86140 C-REACTIVE PROTEIN: CPT

## 2023-08-14 PROCEDURE — 11000001 HC ACUTE MED/SURG PRIVATE ROOM

## 2023-08-14 RX ORDER — MORPHINE SULFATE 10 MG/ML
2 INJECTION INTRAMUSCULAR; INTRAVENOUS; SUBCUTANEOUS
Status: DISCONTINUED | OUTPATIENT
Start: 2023-08-14 | End: 2023-08-16 | Stop reason: HOSPADM

## 2023-08-14 RX ORDER — KETOROLAC TROMETHAMINE 30 MG/ML
15 INJECTION, SOLUTION INTRAMUSCULAR; INTRAVENOUS EVERY 6 HOURS
Status: DISCONTINUED | OUTPATIENT
Start: 2023-08-14 | End: 2023-08-15

## 2023-08-14 RX ORDER — LORAZEPAM 2 MG/ML
INJECTION INTRAMUSCULAR
Status: COMPLETED
Start: 2023-08-14 | End: 2023-08-14

## 2023-08-14 RX ADMIN — OXYCODONE HYDROCHLORIDE 10 MG: 5 TABLET ORAL at 08:08

## 2023-08-14 RX ADMIN — CEFAZOLIN SODIUM 2 G: 2 SOLUTION INTRAVENOUS at 04:08

## 2023-08-14 RX ADMIN — POLYETHYLENE GLYCOL 3350 17 G: 17 POWDER, FOR SOLUTION ORAL at 09:08

## 2023-08-14 RX ADMIN — KETOROLAC TROMETHAMINE 15 MG: 30 INJECTION, SOLUTION INTRAMUSCULAR; INTRAVENOUS at 01:08

## 2023-08-14 RX ADMIN — KETOROLAC TROMETHAMINE 15 MG: 30 INJECTION, SOLUTION INTRAMUSCULAR; INTRAVENOUS at 07:08

## 2023-08-14 RX ADMIN — METHOCARBAMOL 500 MG: 500 TABLET ORAL at 10:08

## 2023-08-14 RX ADMIN — OXYCODONE HYDROCHLORIDE 10 MG: 5 TABLET ORAL at 01:08

## 2023-08-14 RX ADMIN — LORAZEPAM 0.25 MG: 2 INJECTION INTRAMUSCULAR; INTRAVENOUS at 03:08

## 2023-08-14 RX ADMIN — METHOCARBAMOL 500 MG: 500 TABLET ORAL at 01:08

## 2023-08-14 RX ADMIN — METHOCARBAMOL 500 MG: 500 TABLET ORAL at 06:08

## 2023-08-14 RX ADMIN — LORAZEPAM 0.25 MG: 2 INJECTION INTRAMUSCULAR at 03:08

## 2023-08-14 RX ADMIN — OXYCODONE HYDROCHLORIDE 10 MG: 5 TABLET ORAL at 04:08

## 2023-08-14 RX ADMIN — ACETAMINOPHEN 650 MG: 325 TABLET, FILM COATED ORAL at 10:08

## 2023-08-14 RX ADMIN — CEFAZOLIN SODIUM 2 G: 2 SOLUTION INTRAVENOUS at 12:08

## 2023-08-14 RX ADMIN — GABAPENTIN 300 MG: 300 CAPSULE ORAL at 08:08

## 2023-08-14 RX ADMIN — ACETAMINOPHEN 650 MG: 325 TABLET, FILM COATED ORAL at 09:08

## 2023-08-14 RX ADMIN — GABAPENTIN 300 MG: 300 CAPSULE ORAL at 09:08

## 2023-08-14 RX ADMIN — DOCUSATE SODIUM 100 MG: 100 CAPSULE, LIQUID FILLED ORAL at 09:08

## 2023-08-14 RX ADMIN — MORPHINE SULFATE 2 MG: 10 INJECTION, SOLUTION INTRAMUSCULAR; INTRAVENOUS at 11:08

## 2023-08-14 RX ADMIN — DOCUSATE SODIUM 100 MG: 100 CAPSULE, LIQUID FILLED ORAL at 08:08

## 2023-08-14 RX ADMIN — ENOXAPARIN SODIUM 40 MG: 40 INJECTION SUBCUTANEOUS at 09:08

## 2023-08-14 RX ADMIN — ACETAMINOPHEN 650 MG: 325 TABLET, FILM COATED ORAL at 01:08

## 2023-08-14 RX ADMIN — ONDANSETRON 4 MG: 2 INJECTION INTRAMUSCULAR; INTRAVENOUS at 10:08

## 2023-08-14 RX ADMIN — OXYCODONE HYDROCHLORIDE 5 MG: 5 TABLET ORAL at 09:08

## 2023-08-14 RX ADMIN — ENOXAPARIN SODIUM 40 MG: 40 INJECTION SUBCUTANEOUS at 08:08

## 2023-08-14 RX ADMIN — CEFAZOLIN SODIUM 2 G: 2 SOLUTION INTRAVENOUS at 09:08

## 2023-08-14 RX ADMIN — ACETAMINOPHEN 650 MG: 325 TABLET, FILM COATED ORAL at 06:08

## 2023-08-14 RX ADMIN — Medication 6 MG: at 08:08

## 2023-08-14 RX ADMIN — GABAPENTIN 300 MG: 300 CAPSULE ORAL at 03:08
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
## 2023-08-14 NOTE — PROGRESS NOTES
Pt Hx and procedure discussed in detail. Post op orders reviewed. Pt attached to v/s machine and vitals WNL. Procedure site and IV assessed and intact. Everyone understands pt info with no further questions.
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
## 2023-08-14 NOTE — PROGRESS NOTES
[facility] - Periop Services  Orthopedics  Progress Note    Patient Name: Esdras Souza  MRN: [id number]  Admission Date: 8/12/2023  Hospital Length of Stay: 2 days  Attending Provider: René Manriquez MD  Primary Care Provider: No primary care provider on file.  Follow-up For: Procedure(s) (LRB):  ORIF, FOREARM (Left)    Post-Operative Day: 1 Day Post-Op  Subjective:     Principal Problem:Fracture of radial shaft, left, closed    Principal Orthopedic Problem: 1 Day Post-Op   Open reduction internal fixation left proximal radial shaft fracture and radial head fracture, open reduction internal fixation left olecranon fracture, closed treatment pelvic ring    Interval History:  Patient had some pain overnight.  We unwrapped his Ace wrap this morning which will lead to a lot of his pain.  No numbness or tingling.    Review of patient's allergies indicates:   Allergen Reactions    Iodinated contrast media Anaphylaxis       Current Facility-Administered Medications   Medication    acetaminophen tablet 650 mg    albuterol-ipratropium 2.5 mg-0.5 mg/3 mL nebulizer solution 3 mL    cefazolin (ANCEF) 2 gram in dextrose 5% 50 mL IVPB (premix)    docusate sodium capsule 100 mg    electrolyte-A infusion    enoxaparin injection 40 mg    fentaNYL injection 50 mcg    gabapentin capsule 300 mg    HYDROmorphone (PF) injection 0.2 mg    HYDROmorphone (PF) injection 0.4 mg    lactated ringers infusion    LIDOcaine (PF) 10 mg/ml (1%) injection 10 mg    magnesium hydroxide 400 mg/5 ml suspension 2,400 mg    melatonin tablet 6 mg    meperidine (PF) injection 12.5 mg    methocarbamoL tablet 500 mg    ondansetron disintegrating tablet 8 mg    ondansetron injection 4 mg    oxyCODONE immediate release tablet 10 mg    oxyCODONE immediate release tablet 5 mg    polyethylene glycol packet 17 g    prochlorperazine injection Soln 5 mg     Objective:     Vital Signs (Most Recent):  Temp: 97.7 °F (36.5 °C) (08/13/23 1859)  Pulse: 96  "(08/14/23 0500)  Resp: 20 (08/14/23 0500)  BP: 119/73 (08/14/23 0500)  SpO2: 98 % (08/14/23 0500) Vital Signs (24h Range):  Temp:  [97.7 °F (36.5 °C)] 97.7 °F (36.5 °C)  Pulse:  [] 96  Resp:  [9-30] 20  SpO2:  [94 %-100 %] 98 %  BP: ()/() 119/73     Weight: 52.2 kg (115 lb)  Height: 5' 2" (157.5 cm)  Body mass index is 21.03 kg/m².      Intake/Output Summary (Last 24 hours) at 8/14/2023 0704  Last data filed at 8/13/2023 1850  Gross per 24 hour   Intake 1300 ml   Output --   Net 1300 ml       Physical Exam:     General the patient is alert and oriented x3 no acute distress nontoxic-appearing appropriate affect.    Constitutional: Vital signs are examined and stable.  Resp: No signs of labored breathing    LUE: --Skin: Dressing CDI, No signs of new abrasions or lacerations, no scars           -MSK: STR 5/5 AIN/PIN/Median/Radial/Ulnar motor           -Neuro:  Sensation intact to light touch C5-T1 dermatomes           -Lymphatic: No signs of lymphadenopathy, No signs of swelling,           -CV:Capillary refill is less than 2 seconds. Radial and ulnar pulses 2/4. Compartments Soft and compressible    Patient appears neurovascularly intact compartments are soft compressible.  Low concern for compartment syndrome at this time he is continued to elevate                Diagnostic Findings:   Significant Labs:   Recent Lab Results  (Last 5 results in the past 72 hours)        08/13/23  1956   08/13/23  1920   08/13/23  0343   08/12/23  2325   08/12/23  2321        Albumin/Globulin Ratio     1.9     1.5       ABO and RH       O NEG         Albumin     4.0     4.4       Alcohol, Serum         <10.0       Alkaline Phosphatase     74     87       ALT     37     40       aPTT         24.5  Comment: For Minimal Heparin Infusion, the goal aPTT 64-85 seconds corresponds to an anti-Xa of 0.3-0.5.    For Low Intensity and High Intensity Heparin, the goal aPTT  seconds corresponds to an anti-Xa of 0.3-0.7       " AST     54     45       Baso #         0.02       Basophil %         0.2       BILIRUBIN TOTAL     0.4     0.3       BUN     15.2     15.4       Calcium     8.8     9.5       Chloride     108     106       CO2     21     22       Creatinine     0.94     1.06       eGFR     >60     >60       Eos #         0.01       Eosinophil %         0.1       Globulin, Total     2.1     3.0       Glucose     99     103       Group & Rh         O NEG       Hematocrit     38.4     43.4       Hemoglobin     13.2     14.7       Immature Grans (Abs)         0.49       Immature Granulocytes         4.4       Indirect Joe GEL         NEG       INR         1.0       Lactate, Maurice 0.9     1.6     2.9       Lymph #         2.60       LYMPH %         23.4       Magnesium     1.70           MCH     26.9     26.7       MCHC     34.4     33.9       MCV     78.2     78.9       Mono #         0.69       Mono %         6.2       MPV     10.7     10.7       Neut #         7.31       Neut %         65.7       nRBC     0.0     0.0       Phosphorus     3.0           Platelets     158     206       POC Glucose   121             Potassium     3.8     3.6       PROTEIN TOTAL     6.1     7.4       Protime         13.4       RBC     4.91     5.50       RDW     14.4     14.4       Sodium     139     141       Specimen Outdate         08/15/2023 23:59       Troponin I         0.011       WBC     15.89     11.12                               Significant Imaging: I have reviewed all pertinent imaging results/findings.    Assessment/Plan:     Active Diagnoses:    Diagnosis Date Noted POA    PRINCIPAL PROBLEM:  Fracture of radial shaft, left, closed [S52.302A] 08/13/2023 Yes    Multiple fractures of pelvis with disruption of pelvic ring, closed, initial encounter [S32.810A] 08/13/2023 Yes      Problems Resolved During this Admission:     Patient will need to work with physical therapy and may be a rehab candidate due to his pelvic ring injury as well.  No  further surgery planned      He is weight-bearing as tolerated left lower extremity right lower extremity.  He is nonweightbearing range of motion as tolerated left upper extremity        This note/OR report was created with the assistance of  voice recognition software or phone  dictation.  There may be transcription errors as a result of using this technology however minimal. Effort has been made to assure accuracy of transcription but any obvious errors or omissions should be clarified with the author of the document.           Sunil Schneider,   Orthopedic Trauma Surgery  [facility]

## 2023-08-15 LAB
ALBUMIN SERPL-MCNC: 2.8 G/DL (ref 3.5–5)
ALBUMIN/GLOB SERPL: 1.3 RATIO (ref 1.1–2)
ALP SERPL-CCNC: 66 UNIT/L (ref 40–150)
ALT SERPL-CCNC: 24 UNIT/L (ref 0–55)
AST SERPL-CCNC: 89 UNIT/L (ref 5–34)
BASOPHILS # BLD AUTO: 0.02 X10(3)/MCL
BASOPHILS NFR BLD AUTO: 0.2 %
BILIRUB SERPL-MCNC: 0.5 MG/DL
BUN SERPL-MCNC: 12.7 MG/DL (ref 8.9–20.6)
CALCIUM SERPL-MCNC: 8 MG/DL (ref 8.4–10.2)
CHLORIDE SERPL-SCNC: 100 MMOL/L (ref 98–107)
CO2 SERPL-SCNC: 31 MMOL/L (ref 22–29)
CREAT SERPL-MCNC: 0.86 MG/DL (ref 0.73–1.18)
EOSINOPHIL # BLD AUTO: 0.03 X10(3)/MCL (ref 0–0.9)
EOSINOPHIL NFR BLD AUTO: 0.4 %
ERYTHROCYTE [DISTWIDTH] IN BLOOD BY AUTOMATED COUNT: 14.2 % (ref 11.5–17)
GFR SERPLBLD CREATININE-BSD FMLA CKD-EPI: >60 MLS/MIN/1.73/M2
GLOBULIN SER-MCNC: 2.1 GM/DL (ref 2.4–3.5)
GLUCOSE SERPL-MCNC: 105 MG/DL (ref 74–100)
HCT VFR BLD AUTO: 26.3 % (ref 42–52)
HGB BLD-MCNC: 9.1 G/DL (ref 14–18)
IMM GRANULOCYTES # BLD AUTO: 0.04 X10(3)/MCL (ref 0–0.04)
IMM GRANULOCYTES NFR BLD AUTO: 0.5 %
LACTATE SERPL-SCNC: 1.5 MMOL/L (ref 0.5–2.2)
LACTATE SERPL-SCNC: 2.7 MMOL/L (ref 0.5–2.2)
LACTATE SERPL-SCNC: 3.1 MMOL/L (ref 0.5–2.2)
LYMPHOCYTES # BLD AUTO: 0.81 X10(3)/MCL (ref 0.6–4.6)
LYMPHOCYTES NFR BLD AUTO: 9.9 %
MAGNESIUM SERPL-MCNC: 2 MG/DL (ref 1.6–2.6)
MCH RBC QN AUTO: 27.7 PG (ref 27–31)
MCHC RBC AUTO-ENTMCNC: 34.6 G/DL (ref 33–36)
MCV RBC AUTO: 79.9 FL (ref 80–94)
MONOCYTES # BLD AUTO: 0.74 X10(3)/MCL (ref 0.1–1.3)
MONOCYTES NFR BLD AUTO: 9.1 %
NEUTROPHILS # BLD AUTO: 6.52 X10(3)/MCL (ref 2.1–9.2)
NEUTROPHILS NFR BLD AUTO: 79.9 %
NRBC BLD AUTO-RTO: 0 %
PHOSPHATE SERPL-MCNC: 2.1 MG/DL (ref 2.3–4.7)
PLATELET # BLD AUTO: 99 X10(3)/MCL (ref 130–400)
PMV BLD AUTO: 10.4 FL (ref 7.4–10.4)
POCT GLUCOSE: 121 MG/DL (ref 70–110)
POTASSIUM SERPL-SCNC: 4 MMOL/L (ref 3.5–5.1)
PROT SERPL-MCNC: 4.9 GM/DL (ref 6.4–8.3)
RBC # BLD AUTO: 3.29 X10(6)/MCL (ref 4.7–6.1)
SODIUM SERPL-SCNC: 136 MMOL/L (ref 136–145)
WBC # SPEC AUTO: 8.16 X10(3)/MCL (ref 4.5–11.5)

## 2023-08-15 PROCEDURE — 83605 ASSAY OF LACTIC ACID: CPT

## 2023-08-15 PROCEDURE — 83735 ASSAY OF MAGNESIUM: CPT

## 2023-08-15 PROCEDURE — 97166 OT EVAL MOD COMPLEX 45 MIN: CPT

## 2023-08-15 PROCEDURE — 63600175 PHARM REV CODE 636 W HCPCS: Mod: JZ

## 2023-08-15 PROCEDURE — 80053 COMPREHEN METABOLIC PANEL: CPT

## 2023-08-15 PROCEDURE — 63600175 PHARM REV CODE 636 W HCPCS: Performed by: PHYSICIAN ASSISTANT

## 2023-08-15 PROCEDURE — 25000003 PHARM REV CODE 250

## 2023-08-15 PROCEDURE — 11000001 HC ACUTE MED/SURG PRIVATE ROOM

## 2023-08-15 PROCEDURE — 85025 COMPLETE CBC W/AUTO DIFF WBC: CPT

## 2023-08-15 PROCEDURE — 63600175 PHARM REV CODE 636 W HCPCS

## 2023-08-15 PROCEDURE — 25000003 PHARM REV CODE 250: Performed by: ORTHOPAEDIC SURGERY

## 2023-08-15 PROCEDURE — 97162 PT EVAL MOD COMPLEX 30 MIN: CPT

## 2023-08-15 PROCEDURE — 84100 ASSAY OF PHOSPHORUS: CPT

## 2023-08-15 RX ORDER — ACETAMINOPHEN 10 MG/ML
1000 INJECTION, SOLUTION INTRAVENOUS ONCE
Status: COMPLETED | OUTPATIENT
Start: 2023-08-15 | End: 2023-08-15

## 2023-08-15 RX ORDER — KETOROLAC TROMETHAMINE 30 MG/ML
30 INJECTION, SOLUTION INTRAMUSCULAR; INTRAVENOUS EVERY 6 HOURS
Status: DISCONTINUED | OUTPATIENT
Start: 2023-08-15 | End: 2023-08-16 | Stop reason: HOSPADM

## 2023-08-15 RX ORDER — OXYCODONE HYDROCHLORIDE 10 MG/1
10 TABLET ORAL EVERY 4 HOURS PRN
Status: DISCONTINUED | OUTPATIENT
Start: 2023-08-15 | End: 2023-08-16 | Stop reason: HOSPADM

## 2023-08-15 RX ORDER — ONDANSETRON 2 MG/ML
4 INJECTION INTRAMUSCULAR; INTRAVENOUS EVERY 6 HOURS PRN
Status: DISCONTINUED | OUTPATIENT
Start: 2023-08-15 | End: 2023-08-16 | Stop reason: HOSPADM

## 2023-08-15 RX ORDER — MUPIROCIN 20 MG/G
OINTMENT TOPICAL 2 TIMES DAILY
Status: DISCONTINUED | OUTPATIENT
Start: 2023-08-15 | End: 2023-08-16 | Stop reason: HOSPADM

## 2023-08-15 RX ADMIN — DOCUSATE SODIUM 100 MG: 100 CAPSULE, LIQUID FILLED ORAL at 08:08

## 2023-08-15 RX ADMIN — MUPIROCIN: 20 OINTMENT TOPICAL at 08:08

## 2023-08-15 RX ADMIN — ENOXAPARIN SODIUM 40 MG: 40 INJECTION SUBCUTANEOUS at 09:08

## 2023-08-15 RX ADMIN — METHOCARBAMOL 500 MG: 500 TABLET ORAL at 02:08

## 2023-08-15 RX ADMIN — ACETAMINOPHEN 650 MG: 325 TABLET, FILM COATED ORAL at 02:08

## 2023-08-15 RX ADMIN — ONDANSETRON 4 MG: 2 INJECTION INTRAMUSCULAR; INTRAVENOUS at 07:08

## 2023-08-15 RX ADMIN — ONDANSETRON 4 MG: 2 INJECTION INTRAMUSCULAR; INTRAVENOUS at 09:08

## 2023-08-15 RX ADMIN — KETOROLAC TROMETHAMINE 30 MG: 30 INJECTION, SOLUTION INTRAMUSCULAR; INTRAVENOUS at 11:08

## 2023-08-15 RX ADMIN — ACETAMINOPHEN 1000 MG: 10 INJECTION, SOLUTION INTRAVENOUS at 11:08

## 2023-08-15 RX ADMIN — OXYCODONE HYDROCHLORIDE 10 MG: 10 TABLET ORAL at 08:08

## 2023-08-15 RX ADMIN — OXYCODONE HYDROCHLORIDE 10 MG: 5 TABLET ORAL at 02:08

## 2023-08-15 RX ADMIN — KETOROLAC TROMETHAMINE 15 MG: 30 INJECTION, SOLUTION INTRAMUSCULAR; INTRAVENOUS at 05:08

## 2023-08-15 RX ADMIN — GABAPENTIN 300 MG: 300 CAPSULE ORAL at 02:08

## 2023-08-15 RX ADMIN — GABAPENTIN 300 MG: 300 CAPSULE ORAL at 09:08

## 2023-08-15 RX ADMIN — OXYCODONE HYDROCHLORIDE 15 MG: 5 TABLET ORAL at 03:08

## 2023-08-15 RX ADMIN — SODIUM PHOSPHATE, MONOBASIC, MONOHYDRATE AND SODIUM PHOSPHATE, DIBASIC, ANHYDROUS 15 MMOL: 142; 276 INJECTION, SOLUTION INTRAVENOUS at 07:08

## 2023-08-15 RX ADMIN — KETOROLAC TROMETHAMINE 15 MG: 30 INJECTION, SOLUTION INTRAMUSCULAR; INTRAVENOUS at 12:08

## 2023-08-15 RX ADMIN — POLYETHYLENE GLYCOL 3350 17 G: 17 POWDER, FOR SOLUTION ORAL at 09:08

## 2023-08-15 RX ADMIN — Medication 6 MG: at 08:08

## 2023-08-15 RX ADMIN — ENOXAPARIN SODIUM 40 MG: 40 INJECTION SUBCUTANEOUS at 08:08

## 2023-08-15 RX ADMIN — MORPHINE SULFATE 2 MG: 10 INJECTION, SOLUTION INTRAMUSCULAR; INTRAVENOUS at 10:08

## 2023-08-15 RX ADMIN — KETOROLAC TROMETHAMINE 30 MG: 30 INJECTION, SOLUTION INTRAMUSCULAR; INTRAVENOUS at 05:08

## 2023-08-15 RX ADMIN — DOCUSATE SODIUM 100 MG: 100 CAPSULE, LIQUID FILLED ORAL at 09:08

## 2023-08-15 RX ADMIN — METHOCARBAMOL 500 MG: 500 TABLET ORAL at 09:08

## 2023-08-15 RX ADMIN — METHOCARBAMOL 500 MG: 500 TABLET ORAL at 05:08

## 2023-08-15 RX ADMIN — GABAPENTIN 300 MG: 300 CAPSULE ORAL at 08:08
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
## 2023-08-15 NOTE — PLAN OF CARE
Problem: Occupational Therapy  Goal: Occupational Therapy Goal  Description: Goals to be met by: 8/29     Patient will increase functional independence with ADLs by performing:    UE Dressing with Stand-by Assistance.  LE Dressing with Stand-by Assistance.  Grooming while standing at sink with Stand-by Assistance.  Toileting from toilet with Stand-by Assistance for hygiene and clothing management.   Toilet transfer to bedside commode with Stand-by Assistance.  Upper extremity exercise program 10 reps per handout, with IND.    Outcome: Ongoing, Progressing

## 2023-08-15 NOTE — PROGRESS NOTES
[facility] - Periop Services  Orthopedics  Progress Note    Patient Name: Esdras Souza  MRN: [id number]  Admission Date: 8/12/2023  Hospital Length of Stay: 3 days  Attending Provider: Juan A Izaguirre MD  Primary Care Provider: Gina, Primary Doctor  Follow-up For: Procedure(s) (LRB):  ORIF, FOREARM (Left)    Post-Operative Day: 2 Day Post-Op  Subjective:     Principal Problem:Fracture of radial shaft, left, closed    Principal Orthopedic Problem: 2 Days Post-Op   Open reduction internal fixation left proximal radial shaft fracture and radial head fracture, open reduction internal fixation left olecranon fracture, closed treatment pelvic ring    Interval History:  Patient had some pain overnight. States still pain full this morning despite loosening the ace wrap. He is  not elevated today, moderately swollen. No numbness or tingling.    Review of patient's allergies indicates:   Allergen Reactions    Iodinated contrast media Anaphylaxis       Current Facility-Administered Medications   Medication    acetaminophen tablet 650 mg    docusate sodium capsule 100 mg    enoxaparin injection 40 mg    gabapentin capsule 300 mg    ketorolac injection 15 mg    magnesium hydroxide 400 mg/5 ml suspension 2,400 mg    melatonin tablet 6 mg    methocarbamoL tablet 500 mg    morphine injection 2 mg    ondansetron injection 4 mg    oxyCODONE immediate release tablet 5 mg    oxyCODONE immediate release tablet Tab 10 mg    polyethylene glycol packet 17 g    sodium phosphate 15 mmol in dextrose 5 % (D5W) 250 mL IVPB     Objective:     Vital Signs (Most Recent):  Temp: 98 °F (36.7 °C) (08/15/23 0353)  Pulse: 87 (08/15/23 0353)  Resp: 16 (08/15/23 0353)  BP: 110/63 (08/15/23 0353)  SpO2: 99 % (08/15/23 0353) Vital Signs (24h Range):  Temp:  [97.7 °F (36.5 °C)-99.3 °F (37.4 °C)] 98 °F (36.7 °C)  Pulse:  [] 87  Resp:  [10-22] 16  SpO2:  [94 %-100 %] 99 %  BP: (106-149)/(63-84) 110/63     Weight: 52.2 kg (115 lb)  Height: 5'  "2.01" (157.5 cm)  Body mass index is 21.03 kg/m².      Intake/Output Summary (Last 24 hours) at 8/15/2023 0813  Last data filed at 8/14/2023 1601  Gross per 24 hour   Intake --   Output 250 ml   Net -250 ml         Physical Exam:     General the patient is alert and oriented x3 no acute distress nontoxic-appearing appropriate affect.    Constitutional: Vital signs are examined and stable.  Resp: No signs of labored breathing    LUE: --Skin: Dressing CDI, removed and replaced with island dressings, No signs of new abrasions or lacerations, no scars           -MSK: STR 5/5 AIN/PIN/Median/Radial/Ulnar motor           -Neuro:  Sensation intact to light touch C5-T1 dermatomes           -Lymphatic: No signs of lymphadenopathy, No signs of swelling,           -CV:Capillary refill is less than 2 seconds. Radial and ulnar pulses 2/4. Compartments Soft and compressible    Patient appears neurovascularly intact compartments are soft compressible.  Low concern for compartment syndrome at this time he is continued to elevate                Diagnostic Findings:   Significant Labs:   Recent Lab Results  (Last 5 results in the past 72 hours)        08/15/23  0504   08/14/23 2016 08/14/23  0609   08/13/23 1956 08/13/23 1954        Albumin/Globulin Ratio 1.3     1.4           Albumin 2.8     3.3           Alkaline Phosphatase 66     61           ALT 24     26           AST 89     56           Baso # 0.02     0.03           Basophil % 0.2     0.2           BILIRUBIN TOTAL 0.5     0.5           BUN 12.7     10.0           Calcium 8.0     8.3           Chloride 100     100           CO2 31     25           Creatinine 0.86     0.77           CRP     156.00           eGFR >60     >60           Eos # 0.03     0.00           Eosinophil % 0.4     0.0           Globulin, Total 2.1     2.4           Glucose 105     80           Hematocrit 26.3     33.7           Hemoglobin 9.1     11.2           Immature Grans (Abs) 0.04     0.08        "    Immature Granulocytes 0.5     0.6           Lactate, Maurice   0.9     0.9         Lymph # 0.81     0.78           LYMPH % 9.9     6.0           Magnesium 2.00     1.80           MCH 27.7     27.3           MCHC 34.6     33.2           MCV 79.9     82.0           Mono # 0.74     0.85           Mono % 9.1     6.5           MPV 10.4     10.8           Neut # 6.52     11.30           Neut % 79.9     86.7           nRBC 0.0     0.0           Phosphorus 2.1     2.7           Platelets 99     134           POC Glucose               POCT Glucose         121       Potassium 4.0     4.1           Prealbumin     16.8           PROTEIN TOTAL 4.9     5.7           RBC 3.29     4.11           RDW 14.2     14.6           Sodium 136     133           WBC 8.16     13.04                                   Significant Imaging: I have reviewed all pertinent imaging results/findings.    Assessment/Plan:     Active Diagnoses:    Diagnosis Date Noted POA    PRINCIPAL PROBLEM:  Fracture of radial shaft, left, closed [S52.302A] 08/13/2023 Yes    Multiple fractures of pelvis with disruption of pelvic ring, closed, initial encounter [S32.810A] 08/13/2023 Yes      Problems Resolved During this Admission:   Labs stable this morning  He is weight-bearing as tolerated to the bilateral lower ext. Will treat pelvic ring injury non operatively. He is nonweightbearing range of motion as tolerated left upper extremity.  Work with therapy on mobilizing. Okay for ROM of the elbow now that he is out of splint. Daily dry dressing changes. DVT ppx x 30 days upon discharge.   Continue multimodal pain control  Will get upper ext elevator to help with swelling.   Follow up with Dr. Schneider in approx 3 weeks for wound check and repeat x rays.     The above findings, diagnostics, and treatment plan were discussed with Dr. Schneider who is in agreement with the plan of care except as stated in additional documentation. .    Alexandra Blair Lemaire, PA-C [facility]   Orthopedic Trauma

## 2023-08-15 NOTE — PLAN OF CARE
08/15/23 1630   Discharge Assessment   Assessment Type Discharge Planning Reassessment   DME Needed Upon Discharge  cane, quad   Discharge Plan discussed with: Patient;Parent(s)   Discharge Plan A Home Health   Discharge Plan B Home with family     Discussed dc plan with pt and his dad Curtis Souza [phone] is present in room. Pt and dad agreeable for HH at [place]. Assigned HH is [facility]. [email or web address]   PT informed he can purchase quad cane at [employer] for $27 or another drug store. Dad understands and will get quad cane.

## 2023-08-15 NOTE — PROGRESS NOTES
[facility]  Wound Care    Patient Name:  Esdras Souza   MRN:  [id number]  Date: 8/15/2023  Diagnosis: Fracture of radial shaft, left, closed    History:     No past medical history on file.    Social History     Socioeconomic History    Marital status: Single       Precautions:     Allergies as of 08/12/2023 - Reviewed 08/12/2023   Allergen Reaction Noted    Iodinated contrast media Anaphylaxis 08/12/2023       WOC Assessment Details/Treatment        08/15/23 0956        Altered Skin Integrity 08/12/23 2355 Left anterior Hip #3 Abrasion(s) Partial thickness tissue loss. Shallow open ulcer with a red or pink wound bed, without slough. Intact or Open/Ruptured Serum-filled blister.   Date First Assessed/Time First Assessed: 08/12/23 2355   Altered Skin Integrity Present on Admission - Did Patient arrive to the hospital with altered skin?: yes  Side: Left  Orientation: anterior  Location: Hip  Wound Number: #3  Is this injury devic...   Wound Image    Dressing Appearance Open to air   Drainage Amount None   Appearance Red   Tissue loss description Partial thickness   Periwound Area Excoriated;Redness   Wound Edges Irregular   Care Cleansed with:;Sterile normal saline   Dressing Applied        Altered Skin Integrity 08/12/23 2355 Right posterior Elbow #4 Abrasion(s) Purple or maroon localized area of discolored intact skin or non-intact skin or a blood-filled blister.   Date First Assessed/Time First Assessed: 08/12/23 2355   Altered Skin Integrity Present on Admission - Did Patient arrive to the hospital with altered skin?: yes  Side: Right  Orientation: posterior  Location: Elbow  Wound Number: #4  Is this injury d...   Wound Image    Dressing Appearance Open to air   Drainage Amount None   Appearance Red;Purple   Periwound Area Redness   Wound Edges Irregular   Wound Length (cm) 5 cm   Wound Width (cm) 1 cm   Wound Surface Area (cm^2) 5 cm^2   Care Cleansed with:;Sterile normal saline    Dressing Applied        Altered Skin Integrity 08/12/23 2355 Right lateral;upper Back #5 Abrasion(s)   Date First Assessed/Time First Assessed: 08/12/23 2355   Altered Skin Integrity Present on Admission - Did Patient arrive to the hospital with altered skin?: yes  Side: Right  Orientation: lateral;upper  Location: Back  Wound Number: #5  Is this injur...   Wound Image     Dressing Appearance Open to air   Drainage Amount None   Appearance Red;Pink;Dry   Tissue loss description Partial thickness   Periwound Area Excoriated   Wound Edges Irregular   Wound Length (cm) 15 cm   Wound Width (cm) 17 cm   Wound Depth (cm) 0.1 cm   Wound Volume (cm^3) 25.5 cm^3   Wound Surface Area (cm^2) 255 cm^2   Care Cleansed with:;Sterile normal saline   Dressing Applied        Altered Skin Integrity 08/12/23 2355 Left lateral;upper Back #6 Abrasion(s)   Date First Assessed/Time First Assessed: 08/12/23 2355   Altered Skin Integrity Present on Admission - Did Patient arrive to the hospital with altered skin?: yes  Side: Left  Orientation: lateral;upper  Location: Back  Wound Number: #6  Is this injury...   Wound Image    Dressing Appearance Open to air   Drainage Amount None   Appearance Pink   Tissue loss description Partial thickness   Periwound Area Intact;Dry   Wound Edges Defined   Wound Length (cm) 1 cm   Wound Width (cm) 1 cm   Wound Depth (cm) 0.1 cm   Wound Volume (cm^3) 0.1 cm^3   Wound Surface Area (cm^2) 1 cm^2   Care Cleansed with:;Sterile normal saline   Dressing Applied     WOCN consulted for multiple abrasion sites. Family at bedside. 8/13 S/P Open reduction internal fixation left proximal radial shaft fracture and radial head fracture, open reduction internal fixation left olecranon fracture, closed treatment pelvic ring. Dressing to left arm changed today and is being followed per Orthopedic surgery. Treatment recommendations put into place.  Abrasions: Cleanse with NS. Apply Bactroban ointment, cover with foam.  Change BID. Keep areas clean and dry, no adult briefs while in bed. Nursing to continue with turning every two hours, wedge and floating heels.  Therapy at bedside assisting back into bed. Will follow up.    08/15/2023
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
## 2023-08-15 NOTE — PLAN OF CARE
Problem: Physical Therapy  Goal: Physical Therapy Goal  Description: Goals to be met by: 9/15/23     Patient will increase functional independence with mobility by performin. Supine to sit with Set-up [place]  2. Sit to supine with Set-up [place]  3. Sit to stand transfer with Stand-by Assistance  4. Gait  x 200 feet with Stand-by Assistance using Quad Cane (if needed).   5. Ascend/descend 5 stairs with left Handrails & Contact Guard Assistance     Outcome: Ongoing, Progressing

## 2023-08-15 NOTE — NURSING
Nurses Note -- 4 Eyes      8/15/2023   10:53 AM      Skin assessed during: Admit      [] No Altered Skin Integrity Present    []Prevention Measures Documented      [x] Yes- Altered Skin Integrity Present or Discovered   [] LDA Added if Not in Epic (Describe Wound)   [x] New Altered Skin Integrity was Present on Admit and Documented in LDA   [x] Wound Image Taken    Wound Care Consulted? Yes    Attending Nurse:  Nevaeh Escalante RN    Second RN/Staff Member:   IRENE Garcia RN

## 2023-08-16 VITALS
WEIGHT: 115 LBS | TEMPERATURE: 98 F | DIASTOLIC BLOOD PRESSURE: 66 MMHG | HEART RATE: 86 BPM | SYSTOLIC BLOOD PRESSURE: 113 MMHG | HEIGHT: 62 IN | RESPIRATION RATE: 20 BRPM | OXYGEN SATURATION: 98 % | BODY MASS INDEX: 21.16 KG/M2
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
## 2023-08-16 LAB
ALBUMIN SERPL-MCNC: 2.9 G/DL (ref 3.5–5)
ALBUMIN/GLOB SERPL: 1.1 RATIO (ref 1.1–2)
ALP SERPL-CCNC: 81 UNIT/L (ref 40–150)
ALT SERPL-CCNC: 27 UNIT/L (ref 0–55)
AST SERPL-CCNC: 95 UNIT/L (ref 5–34)
BASOPHILS # BLD AUTO: 0.01 X10(3)/MCL
BASOPHILS NFR BLD AUTO: 0.1 %
BILIRUB SERPL-MCNC: 0.5 MG/DL
BUN SERPL-MCNC: 8.5 MG/DL (ref 8.9–20.6)
CALCIUM SERPL-MCNC: 8.4 MG/DL (ref 8.4–10.2)
CHLORIDE SERPL-SCNC: 101 MMOL/L (ref 98–107)
CO2 SERPL-SCNC: 26 MMOL/L (ref 22–29)
CREAT SERPL-MCNC: 0.8 MG/DL (ref 0.73–1.18)
EOSINOPHIL # BLD AUTO: 0.04 X10(3)/MCL (ref 0–0.9)
EOSINOPHIL NFR BLD AUTO: 0.5 %
ERYTHROCYTE [DISTWIDTH] IN BLOOD BY AUTOMATED COUNT: 14.5 % (ref 11.5–17)
GFR SERPLBLD CREATININE-BSD FMLA CKD-EPI: >60 MLS/MIN/1.73/M2
GLOBULIN SER-MCNC: 2.7 GM/DL (ref 2.4–3.5)
GLUCOSE SERPL-MCNC: 105 MG/DL (ref 74–100)
HCT VFR BLD AUTO: 24.7 % (ref 42–52)
HGB BLD-MCNC: 8.5 G/DL (ref 14–18)
IMM GRANULOCYTES # BLD AUTO: 0.04 X10(3)/MCL (ref 0–0.04)
IMM GRANULOCYTES NFR BLD AUTO: 0.5 %
LACTATE SERPL-SCNC: 1.6 MMOL/L (ref 0.5–2.2)
LYMPHOCYTES # BLD AUTO: 0.95 X10(3)/MCL (ref 0.6–4.6)
LYMPHOCYTES NFR BLD AUTO: 12.3 %
MAGNESIUM SERPL-MCNC: 1.7 MG/DL (ref 1.6–2.6)
MCH RBC QN AUTO: 27.3 PG (ref 27–31)
MCHC RBC AUTO-ENTMCNC: 34.4 G/DL (ref 33–36)
MCV RBC AUTO: 79.4 FL (ref 80–94)
MONOCYTES # BLD AUTO: 0.46 X10(3)/MCL (ref 0.1–1.3)
MONOCYTES NFR BLD AUTO: 6 %
NEUTROPHILS # BLD AUTO: 6.23 X10(3)/MCL (ref 2.1–9.2)
NEUTROPHILS NFR BLD AUTO: 80.6 %
NRBC BLD AUTO-RTO: 0 %
PHOSPHATE SERPL-MCNC: 2.5 MG/DL (ref 2.3–4.7)
PLATELET # BLD AUTO: 97 X10(3)/MCL (ref 130–400)
PMV BLD AUTO: 11.1 FL (ref 7.4–10.4)
POTASSIUM SERPL-SCNC: 3.7 MMOL/L (ref 3.5–5.1)
PROT SERPL-MCNC: 5.6 GM/DL (ref 6.4–8.3)
RBC # BLD AUTO: 3.11 X10(6)/MCL (ref 4.7–6.1)
SODIUM SERPL-SCNC: 138 MMOL/L (ref 136–145)
WBC # SPEC AUTO: 7.73 X10(3)/MCL (ref 4.5–11.5)

## 2023-08-16 PROCEDURE — 63600175 PHARM REV CODE 636 W HCPCS

## 2023-08-16 PROCEDURE — 83735 ASSAY OF MAGNESIUM: CPT

## 2023-08-16 PROCEDURE — 25000003 PHARM REV CODE 250

## 2023-08-16 PROCEDURE — 25000003 PHARM REV CODE 250: Performed by: ORTHOPAEDIC SURGERY

## 2023-08-16 PROCEDURE — 83605 ASSAY OF LACTIC ACID: CPT

## 2023-08-16 PROCEDURE — 80053 COMPREHEN METABOLIC PANEL: CPT

## 2023-08-16 PROCEDURE — 85025 COMPLETE CBC W/AUTO DIFF WBC: CPT

## 2023-08-16 PROCEDURE — 84100 ASSAY OF PHOSPHORUS: CPT

## 2023-08-16 PROCEDURE — 63600175 PHARM REV CODE 636 W HCPCS: Performed by: PHYSICIAN ASSISTANT

## 2023-08-16 PROCEDURE — 97530 THERAPEUTIC ACTIVITIES: CPT

## 2023-08-16 RX ORDER — MAGNESIUM SULFATE HEPTAHYDRATE 40 MG/ML
4 INJECTION, SOLUTION INTRAVENOUS ONCE
Status: COMPLETED | OUTPATIENT
Start: 2023-08-16 | End: 2023-08-16

## 2023-08-16 RX ORDER — SODIUM,POTASSIUM PHOSPHATES 280-250MG
2 POWDER IN PACKET (EA) ORAL
Status: DISCONTINUED | OUTPATIENT
Start: 2023-08-16 | End: 2023-08-16 | Stop reason: HOSPADM

## 2023-08-16 RX ORDER — ASPIRIN 81 MG/1
81 TABLET ORAL DAILY
Qty: 30 TABLET | Refills: 0 | Status: SHIPPED | OUTPATIENT
Start: 2023-08-16 | End: 2023-09-15

## 2023-08-16 RX ORDER — OXYCODONE AND ACETAMINOPHEN 10; 325 MG/1; MG/1
1 TABLET ORAL EVERY 6 HOURS PRN
Qty: 16 TABLET | Refills: 0 | Status: SHIPPED | OUTPATIENT
Start: 2023-08-16 | End: 2023-08-31

## 2023-08-16 RX ORDER — SODIUM CHLORIDE 9 MG/ML
INJECTION, SOLUTION INTRAVENOUS CONTINUOUS
Status: DISCONTINUED | OUTPATIENT
Start: 2023-08-16 | End: 2023-08-16 | Stop reason: HOSPADM

## 2023-08-16 RX ADMIN — OXYCODONE HYDROCHLORIDE 10 MG: 10 TABLET ORAL at 03:08

## 2023-08-16 RX ADMIN — MAGNESIUM SULFATE 4 G: 2 INJECTION INTRAVENOUS at 06:08

## 2023-08-16 RX ADMIN — SODIUM CHLORIDE 1000 ML: 9 INJECTION, SOLUTION INTRAVENOUS at 01:08

## 2023-08-16 RX ADMIN — SODIUM CHLORIDE: 9 INJECTION, SOLUTION INTRAVENOUS at 02:08

## 2023-08-16 RX ADMIN — KETOROLAC TROMETHAMINE 30 MG: 30 INJECTION, SOLUTION INTRAMUSCULAR; INTRAVENOUS at 11:08

## 2023-08-16 RX ADMIN — METHOCARBAMOL 500 MG: 500 TABLET ORAL at 05:08

## 2023-08-16 RX ADMIN — DOCUSATE SODIUM 100 MG: 100 CAPSULE, LIQUID FILLED ORAL at 08:08

## 2023-08-16 RX ADMIN — POLYETHYLENE GLYCOL 3350 17 G: 17 POWDER, FOR SOLUTION ORAL at 08:08

## 2023-08-16 RX ADMIN — KETOROLAC TROMETHAMINE 30 MG: 30 INJECTION, SOLUTION INTRAMUSCULAR; INTRAVENOUS at 05:08

## 2023-08-16 RX ADMIN — GABAPENTIN 300 MG: 300 CAPSULE ORAL at 03:08

## 2023-08-16 RX ADMIN — POTASSIUM & SODIUM PHOSPHATES POWDER PACK 280-160-250 MG 2 PACKET: 280-160-250 PACK at 08:08

## 2023-08-16 RX ADMIN — METHOCARBAMOL 500 MG: 500 TABLET ORAL at 03:08

## 2023-08-16 RX ADMIN — GABAPENTIN 300 MG: 300 CAPSULE ORAL at 08:08

## 2023-08-16 RX ADMIN — POTASSIUM & SODIUM PHOSPHATES POWDER PACK 280-160-250 MG 2 PACKET: 280-160-250 PACK at 11:08

## 2023-08-16 RX ADMIN — ENOXAPARIN SODIUM 40 MG: 40 INJECTION SUBCUTANEOUS at 08:08

## 2023-08-16 RX ADMIN — OXYCODONE HYDROCHLORIDE 15 MG: 5 TABLET ORAL at 08:08
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
## 2023-08-16 NOTE — PLAN OF CARE
Problem: Adult Inpatient Plan of Care  Goal: Plan of Care Review  Outcome: Ongoing, Progressing  Goal: Optimal Comfort and Wellbeing  Outcome: Ongoing, Progressing  Goal: Readiness for Transition of Care  Outcome: Ongoing, Progressing     Problem: Impaired Wound Healing  Goal: Optimal Wound Healing  Outcome: Ongoing, Progressing     Problem: Fall Injury Risk  Goal: Absence of Fall and Fall-Related Injury  Outcome: Ongoing, Progressing       Educated in measures to decrease arm swelling. Verbalizes understanding. Family present for education.
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
## 2023-08-16 NOTE — PROGRESS NOTES
Patient seen and examined this evening. He is not elevated on the wedge today. States everyone keeps telling him he doesnt need it and that it is too high. His Dad and brother are bedside today. He is being discharged home with mom. Remains swollen but compartments are soft and compressible, moderate pain with ROM at the elbow. I thoroughly discussed the importance of early range of motion to prevent permanent stiffness at the left elbow. He will work with home PT and follow up in 2 weeks for mobility check with Dr. Schneider. Sutures will remain in place x 3 weeks. Continue ice elevation and multimodal pain control. He is sleeping upon entering the room and falls asleep after dressing change. Arm dressed with telfa, 4x4s and conforming gauze. May leave open to air if no drainage. No showers until POD7. No ointments or creams.   Repeat x rays yesterday show appropriately reduced elbow without dislocation. Hardware remains appropriately aligned.   Note- called to bedside for swelling yesterday around 11:00am. Dr. Schneider present around 12:00 for exam and discussion at that time as well.     The above findings and discussion were reviewed with Dr. Schneider who is in agreement except as stated in additional documentation.     ST[facility]ANIE [facility]Will[facility]  [facility]

## 2023-08-16 NOTE — NURSING
DISCHARGE INSTRUCTIONS GIVEN PATIENT AND FAMILY VERBALIZED UNDERSTANDING DRESSING TO SX SITE CLEAN DRY INTACT. PATIENT C/O PAIN DENIES CP SOB. EDEMA NOTED TO LEFT ARM / HAND PATIENT AND FAMILY EDUCATED ON ELEVATION OF SX EXTREMITY AND IMPORTANCE OF KEEPING ANY AND ALL F/U APPOINTMENTS, BOTH VERBALIZED UNDERSTANDING
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
## 2023-08-17 ENCOUNTER — PATIENT OUTREACH (OUTPATIENT)
Dept: ADMINISTRATIVE | Facility: CLINIC | Age: 23
End: 2023-08-17
Payer: MEDICAID

## 2023-08-17 NOTE — PROGRESS NOTES
C3 nurse attempted to contact Esdras Souza  for a TCC post hospital discharge follow up call. No answer. No voicemail available. The patient has a scheduled HOSFU appointment with Chandler Carter FNP 8/28/2023  1045. am

## 2023-08-17 NOTE — PROGRESS NOTES
C3 nurse spoke with Esdras Souza mother Jeanne for a TCC post hospital discharge follow up call. The patient has a scheduled HOSFU appointment with Chandler Carter FNP 8/28/2023  1045. am

## 2023-08-21 NOTE — ANESTHESIA POSTPROCEDURE EVALUATION
Anesthesia Post Evaluation    Patient: Esdras Souza    Procedure(s) Performed: Procedure(s) (LRB):  ORIF, FOREARM (Left)    Final Anesthesia Type: general (/Regional//MAC)      Patient location during evaluation: PACU  Post-procedure mental status: @ basline.  Pain management: adequate    PONV status: See postop meds for drugs used to control n/v if any.  Anesthetic complications: no      Cardiovascular status: blood pressure returned to baseline  Respiratory status: @ baseline.  Hydration status: euvolemic            Vitals Value Taken Time   /71 08/13/23 2230   Temp 36.5 °C (97.7 °F) 08/13/23 1859   Pulse 85 08/13/23 2230   Resp 14 08/13/23 2230   SpO2 100 % 08/13/23 2230         Event Time   Out of Recovery 08/14/2023 16:45:00         Pain/Sudheer Score: No data recorded

## 2023-08-31 ENCOUNTER — OFFICE VISIT (OUTPATIENT)
Dept: ORTHOPEDICS | Facility: CLINIC | Age: 23
End: 2023-08-31
Payer: MEDICAID

## 2023-08-31 ENCOUNTER — HOSPITAL ENCOUNTER (OUTPATIENT)
Dept: RADIOLOGY | Facility: CLINIC | Age: 23
Discharge: HOME | End: 2023-08-31
Attending: ORTHOPAEDIC SURGERY
Payer: MEDICAID

## 2023-08-31 VITALS
BODY MASS INDEX: 21.16 KG/M2 | SYSTOLIC BLOOD PRESSURE: 111 MMHG | HEART RATE: 79 BPM | HEIGHT: 62 IN | TEMPERATURE: 98 F | DIASTOLIC BLOOD PRESSURE: 65 MMHG | WEIGHT: 115 LBS

## 2023-08-31 DIAGNOSIS — S52.135D CLOSED NONDISPLACED FRACTURE OF NECK OF LEFT RADIUS WITH ROUTINE HEALING, SUBSEQUENT ENCOUNTER: Primary | ICD-10-CM

## 2023-08-31 DIAGNOSIS — S52.135D CLOSED NONDISPLACED FRACTURE OF NECK OF LEFT RADIUS WITH ROUTINE HEALING, SUBSEQUENT ENCOUNTER: ICD-10-CM

## 2023-08-31 PROCEDURE — 1159F MED LIST DOCD IN RCRD: CPT | Mod: CPTII,,, | Performed by: ORTHOPAEDIC SURGERY

## 2023-08-31 PROCEDURE — 1159F PR MEDICATION LIST DOCUMENTED IN MEDICAL RECORD: ICD-10-PCS | Mod: CPTII,,, | Performed by: ORTHOPAEDIC SURGERY

## 2023-08-31 PROCEDURE — 73080 X-RAY EXAM OF ELBOW: CPT | Mod: LT,,, | Performed by: ORTHOPAEDIC SURGERY

## 2023-08-31 PROCEDURE — 99024 PR POST-OP FOLLOW-UP VISIT: ICD-10-PCS | Mod: ,,, | Performed by: ORTHOPAEDIC SURGERY

## 2023-08-31 PROCEDURE — 3078F DIAST BP <80 MM HG: CPT | Mod: CPTII,,, | Performed by: ORTHOPAEDIC SURGERY

## 2023-08-31 PROCEDURE — 73080 XR ELBOW COMPLETE 3 VIEW LEFT: ICD-10-PCS | Mod: LT,,, | Performed by: ORTHOPAEDIC SURGERY

## 2023-08-31 PROCEDURE — 3074F PR MOST RECENT SYSTOLIC BLOOD PRESSURE < 130 MM HG: ICD-10-PCS | Mod: CPTII,,, | Performed by: ORTHOPAEDIC SURGERY

## 2023-08-31 PROCEDURE — 3074F SYST BP LT 130 MM HG: CPT | Mod: CPTII,,, | Performed by: ORTHOPAEDIC SURGERY

## 2023-08-31 PROCEDURE — 3008F PR BODY MASS INDEX (BMI) DOCUMENTED: ICD-10-PCS | Mod: CPTII,,, | Performed by: ORTHOPAEDIC SURGERY

## 2023-08-31 PROCEDURE — 3078F PR MOST RECENT DIASTOLIC BLOOD PRESSURE < 80 MM HG: ICD-10-PCS | Mod: CPTII,,, | Performed by: ORTHOPAEDIC SURGERY

## 2023-08-31 PROCEDURE — 99024 POSTOP FOLLOW-UP VISIT: CPT | Mod: ,,, | Performed by: ORTHOPAEDIC SURGERY

## 2023-08-31 PROCEDURE — 3008F BODY MASS INDEX DOCD: CPT | Mod: CPTII,,, | Performed by: ORTHOPAEDIC SURGERY

## 2023-08-31 RX ORDER — OXYCODONE AND ACETAMINOPHEN 5; 325 MG/1; MG/1
1 TABLET ORAL EVERY 6 HOURS PRN
Qty: 28 TABLET | Refills: 0 | Status: SHIPPED | OUTPATIENT
Start: 2023-08-31

## 2023-08-31 NOTE — PROGRESS NOTES
"Subjective:       Patient ID: Esdras Souza is a 23 y.o. male.  Chief Complaint   Patient presents with    Left Elbow - Post-op Evaluation     2.5 week f/u from ORIF left radial neck fx. No complaints at this time. Currently working with home health physical therapy.        HPI:  Patient is 2 and half weeks out from a radial neck and radial shaft fracture with olecranon fracture.  Patient has been doing well at home has worked himself off the pain medication.  Has been working with physical therapy but not gaining much range of motion.  He is dull achy pain left elbow.  No numbness or tingling.  Still smoking    ROS:  Constitutional: Denies fever chills  Eyes: No change in vision  ENT: No ringing or current infections  CV: No chest pain  Resp: No labored breathing  MSK: Pain evident at site of injury located in HPI,   Integ: No signs of abrasions or lacerations  Neuro: No numbness or tingling  Lymphatic: No swelling outside the area of injury     Current Outpatient Medications on File Prior to Visit   Medication Sig Dispense Refill    aspirin (ECOTRIN) 81 MG EC tablet Take 1 tablet (81 mg total) by mouth once daily. 30 tablet 0    oxyCODONE-acetaminophen (PERCOCET)  mg per tablet Take 1 tablet by mouth every 6 (six) hours as needed for Pain. (Patient not taking: Reported on 8/31/2023) 16 tablet 0     No current facility-administered medications on file prior to visit.          Objective:      /65   Pulse 79   Temp 97.8 °F (36.6 °C)   Ht 5' 2" (1.575 m)   Wt 52.2 kg (115 lb)   BMI 21.03 kg/m²   General the patient is alert and oriented x3 no acute distress nontoxic-appearing appropriate affect.    Constitutional: Vital signs are examined and stable.  Resp: No signs of labored breathing    LUE: --Skin:  Staples intact and removed without complication no sign of erythema no sign of drainage No signs of new abrasions or lacerations, no scars           -MSK: STR 5/5 AIN/PIN/Median/Radial/Ulnar motor, " "range of motion limited to 90° of the elbow.  Patient has some stiffness with flexion of the fingers which does improve with gentle force range of motion           -Neuro:  Sensation intact to light touch C5-T1 dermatomes           -Lymphatic: No signs of lymphadenopathy, No signs of swelling,           -CV:Capillary refill is less than 2 seconds. Radial and ulnar pulses 2/4. Compartments Soft and compressible               Body mass index is 21.03 kg/m².  Ideal body weight: 54.6 kg (120 lb 5.9 oz)  No results found for: "HGBA1C"  Hgb   Date Value Ref Range Status   08/16/2023 8.5 (L) 14.0 - 18.0 g/dL Final   08/15/2023 9.1 (L) 14.0 - 18.0 g/dL Final     No results found for: "[id number]"  WBC   Date Value Ref Range Status   08/16/2023 7.73 4.50 - 11.50 x10(3)/mcL Final   08/15/2023 8.16 4.50 - 11.50 x10(3)/mcL Final       Radiology:  Three views left elbow skeletally mature individual with intact hardware no signs of HL fracture lines appear to be well aligned        Assessment:         1. Closed nondisplaced fracture of neck of left radius with routine healing, subsequent encounter  X-Ray Elbow Complete Left    Ambulatory referral/consult to Physical/Occupational Therapy              Plan:         No follow-ups on file.    Esdras was seen today for post-op evaluation.    Diagnoses and all orders for this visit:    Closed nondisplaced fracture of neck of left radius with routine healing, subsequent encounter  -     X-Ray Elbow Complete Left; Future  -     Ambulatory referral/consult to Physical/Occupational Therapy; Future      Patient has severely comminuted radial shaft and radial head fracture was fixed in a olecranon fracture.  Patient has essentially not been moving his elbow at all against medical recommendations.  He has been off the pain medications but will get back on to start physical therapy.  Recommend he goes outpatient physical therapy for aggressive range of motion exercises.  We discussed in " great length with his father and himself about permanent loss of function if he does not move his wrist and elbow.  He states he is had complete return of all sensation.  States he is feeling good.         This note/OR report was created with the assistance of  voice recognition software or phone  dictation.  There may be transcription errors as a result of using this technology however minimal. Effort has been made to assure accuracy of transcription but any obvious errors or omissions should be clarified with the author of the document.     Sunil Schneider DO  Orthopedic Trauma Surgery  08/31/2023      Future Appointments   Date Time Provider Department Center   10/12/2023 10:45 AM Sunil Schneider DO [facility]

## 2023-09-01 ENCOUNTER — TELEPHONE (OUTPATIENT)
Dept: ORTHOPEDICS | Facility: CLINIC | Age: 23
End: 2023-09-01
Payer: MEDICAID

## 2023-09-01 NOTE — TELEPHONE ENCOUNTER
Patient calls in req d/c from  so that outpt PT can be scheduled.  Called [facility]  in [place] , they will d/c patient today.

## 2023-09-06 ENCOUNTER — TELEPHONE (OUTPATIENT)
Dept: ORTHOPEDICS | Facility: CLINIC | Age: 23
End: 2023-09-06
Payer: MEDICAID

## 2023-09-06 NOTE — TELEPHONE ENCOUNTER
Spoke to patient regarding physical therapy. He informed me he is unable to start therapy till next week due to insurance. He states the therapist did give him home exercises to perform till he is able to be seen in their office.

## 2023-09-07 ENCOUNTER — TELEPHONE (OUTPATIENT)
Dept: ORTHOPEDICS | Facility: CLINIC | Age: 23
End: 2023-09-07
Payer: MEDICAID

## 2023-09-07 NOTE — TELEPHONE ENCOUNTER
Patient returned call regarding rescheduling appointment. Informed him the doctor will be out and we are need to reschedule from 09/14/23 to 09/20/23. Patient voiced a clear understanding of new appointment date and time.

## 2023-09-18 ENCOUNTER — DOCUMENT SCAN (OUTPATIENT)
Dept: HOME HEALTH SERVICES | Facility: HOSPITAL | Age: 23
End: 2023-09-18
Payer: MEDICAID

## 2023-09-18 ENCOUNTER — EXTERNAL HOME HEALTH (OUTPATIENT)
Dept: HOME HEALTH SERVICES | Facility: HOSPITAL | Age: 23
End: 2023-09-18
Payer: MEDICAID

## 2023-09-20 ENCOUNTER — OFFICE VISIT (OUTPATIENT)
Dept: ORTHOPEDICS | Facility: CLINIC | Age: 23
End: 2023-09-20
Payer: MEDICAID

## 2023-09-20 ENCOUNTER — HOSPITAL ENCOUNTER (OUTPATIENT)
Dept: RADIOLOGY | Facility: CLINIC | Age: 23
Discharge: HOME | End: 2023-09-20
Attending: ORTHOPAEDIC SURGERY
Payer: MEDICAID

## 2023-09-20 VITALS — WEIGHT: 115 LBS | HEIGHT: 62 IN | BODY MASS INDEX: 21.16 KG/M2

## 2023-09-20 DIAGNOSIS — S52.135D CLOSED NONDISPLACED FRACTURE OF NECK OF LEFT RADIUS WITH ROUTINE HEALING, SUBSEQUENT ENCOUNTER: Primary | ICD-10-CM

## 2023-09-20 DIAGNOSIS — S52.135D CLOSED NONDISPLACED FRACTURE OF NECK OF LEFT RADIUS WITH ROUTINE HEALING, SUBSEQUENT ENCOUNTER: ICD-10-CM

## 2023-09-20 PROCEDURE — 1159F PR MEDICATION LIST DOCUMENTED IN MEDICAL RECORD: ICD-10-PCS | Mod: CPTII,,, | Performed by: PHYSICIAN ASSISTANT

## 2023-09-20 PROCEDURE — 99024 POSTOP FOLLOW-UP VISIT: CPT | Mod: ,,, | Performed by: PHYSICIAN ASSISTANT

## 2023-09-20 PROCEDURE — 3008F BODY MASS INDEX DOCD: CPT | Mod: CPTII,,, | Performed by: PHYSICIAN ASSISTANT

## 2023-09-20 PROCEDURE — 3008F PR BODY MASS INDEX (BMI) DOCUMENTED: ICD-10-PCS | Mod: CPTII,,, | Performed by: PHYSICIAN ASSISTANT

## 2023-09-20 PROCEDURE — 1159F MED LIST DOCD IN RCRD: CPT | Mod: CPTII,,, | Performed by: PHYSICIAN ASSISTANT

## 2023-09-20 PROCEDURE — 73080 XR ELBOW COMPLETE 3 VIEW LEFT: ICD-10-PCS | Mod: LT,,, | Performed by: ORTHOPAEDIC SURGERY

## 2023-09-20 PROCEDURE — 73080 X-RAY EXAM OF ELBOW: CPT | Mod: LT,,, | Performed by: ORTHOPAEDIC SURGERY

## 2023-09-20 PROCEDURE — 99024 PR POST-OP FOLLOW-UP VISIT: ICD-10-PCS | Mod: ,,, | Performed by: PHYSICIAN ASSISTANT

## 2023-09-21 NOTE — PROGRESS NOTES
"Subjective:       Patient ID: Esdras Souza is a 23 y.o. male.  Chief Complaint   Patient presents with    Left Elbow - Follow-up     5.5 WEEK F/U FROM ORIF LEFT RADIAL NECK FX. CURRENTLY WORKING WITH THERAPY. NO COMPLAINTS AT THIS TIME.        HPI:  Patient is 5 and half weeks out from a radial neck and radial shaft fracture with olecranon fracture.  Patient has been doing well at home has worked himself off the pain medication and muscle relaxer. He is working well with physical therapy. He has made great progress since his last evaluation. Has near full flexion. Some loss of extension but is working on this. He does have some significant limitations in his supination and pronation.  He is dull achy pain left elbow.  No numbness or tingling.      ROS:  Constitutional: Denies fever chills  Eyes: No change in vision  ENT: No ringing or current infections  CV: No chest pain  Resp: No labored breathing  MSK: Pain evident at site of injury located in HPI,   Integ: No signs of abrasions or lacerations  Neuro: No numbness or tingling  Lymphatic: No swelling outside the area of injury     Current Outpatient Medications on File Prior to Visit   Medication Sig Dispense Refill    aspirin (ECOTRIN) 81 MG EC tablet Take 1 tablet (81 mg total) by mouth once daily. 30 tablet 0    oxyCODONE-acetaminophen (PERCOCET) 5-325 mg per tablet Take 1 tablet by mouth every 6 (six) hours as needed for Pain. (Patient not taking: Reported on 9/20/2023) 28 tablet 0     No current facility-administered medications on file prior to visit.          Objective:      Ht 5' 2" (1.575 m)   Wt 52.2 kg (115 lb)   BMI 21.03 kg/m²   General the patient is alert and oriented x3 no acute distress nontoxic-appearing appropriate affect.    Constitutional: Vital signs are examined and stable.  Resp: No signs of labored breathing    LUE: --Skin:  Staples intact and removed without complication no sign of erythema no sign of drainage No signs of new abrasions " "or lacerations, no scars           -MSK: STR 5/5 AIN/PIN/Median/Radial/Ulnar motor, able to glex to near full 160 degrees, extension to approx 7-10 degrees..  Patient has some stiffness with flexion of the fingers. Limited supination and pronation of the elbow.           -Neuro:  Sensation intact to light touch C5-T1 dermatomes           -Lymphatic: No signs of lymphadenopathy, No signs of swelling,           -CV:Capillary refill is less than 2 seconds. Radial and ulnar pulses 2/4. Compartments Soft and compressible               Body mass index is 21.03 kg/m².  Ideal body weight: 54.6 kg (120 lb 5.9 oz)  No results found for: "HGBA1C"  Hgb   Date Value Ref Range Status   08/16/2023 8.5 (L) 14.0 - 18.0 g/dL Final   08/15/2023 9.1 (L) 14.0 - 18.0 g/dL Final     No results found for: "[id number]"  WBC   Date Value Ref Range Status   08/16/2023 7.73 4.50 - 11.50 x10(3)/mcL Final   08/15/2023 8.16 4.50 - 11.50 x10(3)/mcL Final       Radiology:  Three views left elbow skeletally mature individual with intact hardware no signs of loosening or failure; fracture lines appear to be well aligned. Minimal consolidation noted as expected in the acute post operative period.        Assessment:         1. Closed nondisplaced fracture of neck of left radius with routine healing, subsequent encounter  X-Ray Elbow Complete Left              Plan:         Follow up in about 6 weeks (around 11/1/2023), or if symptoms worsen or fail to improve.    Esdras was seen today for follow-up.    Diagnoses and all orders for this visit:    Closed nondisplaced fracture of neck of left radius with routine healing, subsequent encounter  -     X-Ray Elbow Complete Left; Future      Patient has severely comminuted radial shaft and radial head fracture was fixed in a olecranon fracture.  He has gained good motion with therapy although has very limited supination and pronation.Has loss of full extension but has very good flexion. Recommend continued " physical therapy and importance of this again today. He seems to be on board with this.  Overall feeling good. Off of all pain medications.   Remain NWB with no p ushing p ulling or lifting until  next follow up.   Hopeful for more signs of healing at that time.   Tylenol and ibuprofen  Nicotine cessation and risk of nonunion discussed.      The above findings, diagnostics, and treatment plan were discussed with Dr. Schneider who is in agreement with the plan of care except as stated in additional documentation.     [facility]   Orthopedic Trauma          Future Appointments   Date Time Provider Department Center   11/1/2023 10:45 AM Sunil Schneider DO [facility] [place]

## 2023-09-25 ENCOUNTER — DOCUMENT SCAN (OUTPATIENT)
Dept: HOME HEALTH SERVICES | Facility: HOSPITAL | Age: 23
End: 2023-09-25
Payer: MEDICAID

## 2024-04-17 ENCOUNTER — HOSPITAL ENCOUNTER (OUTPATIENT)
Dept: RADIOLOGY | Facility: CLINIC | Age: 24
Discharge: HOME | End: 2024-04-17
Attending: ORTHOPAEDIC SURGERY
Payer: MEDICAID

## 2024-04-17 ENCOUNTER — OFFICE VISIT (OUTPATIENT)
Dept: ORTHOPEDICS | Facility: CLINIC | Age: 24
End: 2024-04-17
Payer: MEDICAID

## 2024-04-17 VITALS
BODY MASS INDEX: 21.17 KG/M2 | HEART RATE: 88 BPM | DIASTOLIC BLOOD PRESSURE: 81 MMHG | HEIGHT: 62 IN | SYSTOLIC BLOOD PRESSURE: 131 MMHG | WEIGHT: 115.06 LBS

## 2024-04-17 DIAGNOSIS — S52.135D CLOSED NONDISPLACED FRACTURE OF NECK OF LEFT RADIUS WITH ROUTINE HEALING, SUBSEQUENT ENCOUNTER: ICD-10-CM

## 2024-04-17 DIAGNOSIS — S52.135D CLOSED NONDISPLACED FRACTURE OF NECK OF LEFT RADIUS WITH ROUTINE HEALING, SUBSEQUENT ENCOUNTER: Primary | ICD-10-CM

## 2024-04-17 PROCEDURE — 3008F BODY MASS INDEX DOCD: CPT | Mod: CPTII,,, | Performed by: ORTHOPAEDIC SURGERY

## 2024-04-17 PROCEDURE — 3079F DIAST BP 80-89 MM HG: CPT | Mod: CPTII,,, | Performed by: ORTHOPAEDIC SURGERY

## 2024-04-17 PROCEDURE — 3075F SYST BP GE 130 - 139MM HG: CPT | Mod: CPTII,,, | Performed by: ORTHOPAEDIC SURGERY

## 2024-04-17 PROCEDURE — 1159F MED LIST DOCD IN RCRD: CPT | Mod: CPTII,,, | Performed by: ORTHOPAEDIC SURGERY

## 2024-04-17 PROCEDURE — 73080 X-RAY EXAM OF ELBOW: CPT | Mod: LT,,, | Performed by: ORTHOPAEDIC SURGERY

## 2024-04-17 PROCEDURE — 99214 OFFICE O/P EST MOD 30 MIN: CPT | Mod: ,,, | Performed by: ORTHOPAEDIC SURGERY

## 2024-04-17 NOTE — PROGRESS NOTES
"Subjective:       Patient ID: Esdras Souza is a 23 y.o. male.  Chief Complaint   Patient presents with    Follow-up     8 mos,  ORIF left radial neck fx, sx 8/13/23, states does have some pain at times, states will be at random times pain will come, can not do specify if it is any certain activities, denies taking meds, has no other complaints at this time,        HPI:  Patient is 8mo out from a radial neck and radial shaft fracture with olecranon fracture.  Patient did very well and outpatient physical therapy.  He is back to work.  He is girlfriend's newly pregnant.  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  This is with prominent hardware.  No new numbness tingling.  Intermittent pain levels nonradiating..      ROS:  Constitutional: Denies fever chills  Eyes: No change in vision  ENT: No ringing or current infections  CV: No chest pain  Resp: No labored breathing  MSK: Pain evident at site of injury located in HPI,   Integ: No signs of abrasions or lacerations  Neuro: No numbness or tingling  Lymphatic: No swelling outside the area of injury     Current Outpatient Medications on File Prior to Visit   Medication Sig Dispense Refill    aspirin (ECOTRIN) 81 MG EC tablet Take 1 tablet (81 mg total) by mouth once daily. 30 tablet 0    oxyCODONE-acetaminophen (PERCOCET) 5-325 mg per tablet Take 1 tablet by mouth every 6 (six) hours as needed for Pain. (Patient not taking: Reported on 9/20/2023) 28 tablet 0     No current facility-administered medications on file prior to visit.          Objective:      /81   Pulse 88   Ht 5' 2" (1.575 m)   Wt 52.2 kg (115 lb 1.3 oz)   BMI 21.05 kg/m²   General the patient is alert and oriented x3 no acute distress nontoxic-appearing appropriate affect.    Constitutional: Vital signs are examined and stable.  Resp: No signs of labored breathing    LUE: --Skin:  Incisions are well healed.  Patient has good supination pronation flexion and extension.  " "Near full range of motion.  His middle finger has an extensor lag although he is able to nearly straighten out his fingers.           -MSK: STR 5/5 AIN/PIN/Median/Radial/Ulnar motor,           -Neuro:  Sensation intact to light touch C5-T1 dermatomes           -Lymphatic: No signs of lymphadenopathy, No signs of swelling,           -CV:Capillary refill is less than 2 seconds. Radial and ulnar pulses 2/4. Compartments Soft and compressible               Body mass index is 21.05 kg/m².  Ideal body weight: 54.6 kg (120 lb 5.9 oz)  No results found for: "HGBA1C"  Hgb   Date Value Ref Range Status   08/16/2023 8.5 (L) 14.0 - 18.0 g/dL Final   08/15/2023 9.1 (L) 14.0 - 18.0 g/dL Final     No results found for: "HLSVHDFL57LA"  WBC   Date Value Ref Range Status   08/16/2023 7.73 4.50 - 11.50 x10(3)/mcL Final   08/15/2023 8.16 4.50 - 11.50 x10(3)/mcL Final       Radiology:  Three views left elbow skeletally mature individual with intact hardware no signs of loosening or failure; fracture lines appear to be well aligned. Minimal consolidation noted as expected in the acute post operative period.        Assessment:         1. Closed nondisplaced fracture of neck of left radius with routine healing, subsequent encounter  X-Ray Elbow Complete Left              Plan:         No follow-ups on file.    Esdras was seen today for follow-up.    Diagnoses and all orders for this visit:    Closed nondisplaced fracture of neck of left radius with routine healing, subsequent encounter  -     X-Ray Elbow Complete Left; Future      Patient has severely comminuted radial shaft and radial head fracture was fixed in a olecranon fracture.  He has gained good motion.  He was seen in the ER.  He was osteonecrosis of the radial head.  He is here today for further evaluation.  He states some motions do bother his left elbow although appears he is made remarkable recovery on his range of motion and strength.  He does have some extension deformity in " his middle finger but all other fingers appear to be normal.  He has a working currently.  It was girlfriend is newly pregnant.  We discussed the surgical options in detail for proximally 25 minutes.  With osteonecrosis of the radial head radial head arthroplasty is an option.  I do have concerns about the PIN this likely scarred in around the radial shaft.  I have discussed isolated removal of hardware of the proximal radial neck plate this to because the screws are now protruding once osteonecrosis happened to the radial head.  I have discussed this is likely being the best option if he would like to proceed.  With his range of motion is function I am offered him nonoperative management of this until the symptoms become worse.  He has full range of motion nearly great flexion-extension mild pain at best intermittently.  I would be concerned for further Destabilization of the elbow with a another incision and more surgery.  He understands this.  He said he will talk it over with his father.  We will have him follow up in 6 weeks for further surgical discussion.  I am recommending nonoperative management versus partial hardware removal of the proximal screws to relieve the symptoms of them being in the joint.  No guarantees were made.  This is not guaranteed to improve his symptoms.    I explained that surgery and the nature of their condition are not without risks. These include, but are not limited to, bleeding, infection, neurovascular compromise, malunion, nonunion, hardware complications, wound complications, scarring, cosmetic defects, need for later and/or repeated surgeries, avascular necrosis, bone death due to initial trauma, pain, loss of ROM, loss of function, PTOA, deformity, stance/gait and/or functional abnormalities, thromboembolic complications, compartment syndrome, loss of limb, loss of life, anesthetic complications, and other imponderables. I explained that these can occur despite the adequacy  of treatments rendered, and that their risks are heightened given the nature of their condition.  I have also discussed the importance not using nicotine products due to the increased risk of infection surgical wound healing complications and nonunion of the fracture.  They verbalized understanding.  No guarantees were made.  They would like to continue with surgery at this time. If appropriate family was involved with surgical discussion.     This note/OR report was created with the assistance of  voice recognition software or phone  dictation.  There may be transcription errors as a result of using this technology however minimal. Effort has been made to assure accuracy of transcription but any obvious errors or omissions should be clarified with the author of the document.       Sunil Schneider DO  Orthopedic Trauma Surgery           Future Appointments   Date Time Provider Department Center   5/29/2024  9:30 AM Sunil Schneider DO [facility]

## (undated) DEVICE — STAPLER SKIN PROXIMATE WIDE

## (undated) DEVICE — SCREW T10 SHAFT DRIVER N CANN

## (undated) DEVICE — GAUZE SPONGE 4'X4 12 PLY

## (undated) DEVICE — Device

## (undated) DEVICE — STOCKINETTE IMPERVIOUS LARGE

## (undated) DEVICE — SUT VICRYL BR 1 GEN 27 CT-1

## (undated) DEVICE — GLOVE PROTEXIS HYDROGEL SZ6

## (undated) DEVICE — GLOVE PROTEXIS HYDROGEL SZ9

## (undated) DEVICE — BLADE EZ CLEAN 2 1/2

## (undated) DEVICE — PADDING CAST SOFT-ROLL 6 X 4

## (undated) DEVICE — SCREW BONE CORT 3.5X18MM
Type: IMPLANTABLE DEVICE | Site: ARM | Status: NON-FUNCTIONAL
Removed: 2023-08-13

## (undated) DEVICE — DRESSING GAUZE XEROFORM 5X9

## (undated) DEVICE — BANDAGE ESMARK 4INX3YD

## (undated) DEVICE — SPLINTING ORTHO GLASS1 X15FT

## (undated) DEVICE — PADDING CAST SOFT-ROLL 4 X 4

## (undated) DEVICE — DRAPE ORTH SPLIT 77X108IN

## (undated) DEVICE — SUT MCRYL PLUS 2-0 CT-1 36IN

## (undated) DEVICE — COVER TABLE HVY DTY 60X90IN

## (undated) DEVICE — SLEEVE ECLIPSE GOWN STRL 23IN

## (undated) DEVICE — SCREW POLYAXIAL N LOK 3.5X20MM
Type: IMPLANTABLE DEVICE | Site: ARM | Status: NON-FUNCTIONAL
Removed: 2023-08-13

## (undated) DEVICE — GOWN SMARTGOWN 3XL XLONG

## (undated) DEVICE — SCREW BONE CORT 3.5X16MM
Type: IMPLANTABLE DEVICE | Site: ARM | Status: NON-FUNCTIONAL
Removed: 2023-08-13

## (undated) DEVICE — SUT ETHILON 3-0 FS-1 30

## (undated) DEVICE — GAUZE SPONGE 4X4 12PLY

## (undated) DEVICE — ELECTRODE REM POLYHESIVE II

## (undated) DEVICE — GLOVE PROTEXIS NEU-THERA SZ6

## (undated) DEVICE — DRESSING XEROFORM 5X9IN

## (undated) DEVICE — TAPE SILK 3IN

## (undated) DEVICE — COVER FULLGUARD SHOE HIGH-TOP

## (undated) DEVICE — PADDING 4X4YD SPECIALIST100

## (undated) DEVICE — GLOVE PROTEXIS BLUE LATEX 9

## (undated) DEVICE — APPLICATOR CHLORAPREP ORN 26ML